# Patient Record
Sex: FEMALE | Race: WHITE | NOT HISPANIC OR LATINO | Employment: FULL TIME | URBAN - METROPOLITAN AREA
[De-identification: names, ages, dates, MRNs, and addresses within clinical notes are randomized per-mention and may not be internally consistent; named-entity substitution may affect disease eponyms.]

---

## 2019-07-10 ENCOUNTER — OFFICE VISIT (OUTPATIENT)
Dept: FAMILY MEDICINE CLINIC | Facility: CLINIC | Age: 24
End: 2019-07-10

## 2019-07-10 VITALS
OXYGEN SATURATION: 98 % | BODY MASS INDEX: 42.27 KG/M2 | TEMPERATURE: 98.1 F | HEART RATE: 84 BPM | WEIGHT: 285.4 LBS | DIASTOLIC BLOOD PRESSURE: 80 MMHG | SYSTOLIC BLOOD PRESSURE: 122 MMHG | HEIGHT: 69 IN

## 2019-07-10 DIAGNOSIS — M54.16 LUMBAR RADICULOPATHY: ICD-10-CM

## 2019-07-10 DIAGNOSIS — F41.9 ANXIETY: ICD-10-CM

## 2019-07-10 DIAGNOSIS — J30.89 ENVIRONMENTAL AND SEASONAL ALLERGIES: ICD-10-CM

## 2019-07-10 DIAGNOSIS — N94.6 DYSMENORRHEA: Primary | ICD-10-CM

## 2019-07-10 DIAGNOSIS — F34.1 DYSTHYMIA: ICD-10-CM

## 2019-07-10 PROBLEM — F32.A DEPRESSION: Status: ACTIVE | Noted: 2019-07-10

## 2019-07-10 PROCEDURE — 99214 OFFICE O/P EST MOD 30 MIN: CPT | Performed by: FAMILY MEDICINE

## 2019-07-10 RX ORDER — NORGESTIMATE AND ETHINYL ESTRADIOL 0.25-0.035
1 KIT ORAL DAILY
COMMUNITY
End: 2019-07-10

## 2019-07-10 RX ORDER — NORGESTIMATE AND ETHINYL ESTRADIOL
KIT
COMMUNITY
Start: 2019-04-26 | End: 2019-10-30 | Stop reason: SDUPTHER

## 2019-07-10 RX ORDER — CYCLOBENZAPRINE HCL 10 MG
10 TABLET ORAL 3 TIMES DAILY PRN
Qty: 90 TABLET | Refills: 1 | Status: SHIPPED | OUTPATIENT
Start: 2019-07-10 | End: 2021-08-11

## 2019-07-10 RX ORDER — BACLOFEN 10 MG/1
TABLET ORAL
Refills: 0 | COMMUNITY
Start: 2019-06-12 | End: 2019-07-10

## 2019-07-10 RX ORDER — CITALOPRAM 40 MG/1
40 TABLET ORAL DAILY
COMMUNITY
End: 2019-10-30 | Stop reason: SDUPTHER

## 2019-07-10 RX ORDER — MELOXICAM 15 MG/1
15 TABLET ORAL DAILY
Qty: 30 TABLET | Refills: 1 | Status: SHIPPED | OUTPATIENT
Start: 2019-07-10 | End: 2020-06-04 | Stop reason: SDUPTHER

## 2019-07-10 NOTE — PROGRESS NOTES
Subjective   Toy Brown is a 23 y.o. female.     Chief Complaint   Patient presents with   • Back Pain     Follow up from [facility] for back pain       History of Present Illness   Pt has had a 2 year h/o back pain with periods. Worse for the past 3 cycles. Has been to the ER for this and given flexeril and this has helped. Mostly in low back. Is all the time now. Goes down to right hip and burning and shooting. Worse with activity. Has been doing home therapy plan.     Allergies- doing ok on OTC    Depression/anxiety- stable on meds. Not needing buspar.     The following portions of the patient's history were reviewed and updated as appropriate: allergies, current medications, past family history, past medical history, past social history, past surgical history and problem list.    Past Medical History:   Diagnosis Date   • Migraine    • TMJ arthralgia        Past Surgical History:   Procedure Laterality Date   • APPENDECTOMY     • COLON SURGERY     • TONSILLECTOMY         Family History   Problem Relation Age of Onset   • Diabetes Mother    • Heart disease Mother    • Hyperlipidemia Mother    • Cancer Mother    • Hyperlipidemia Father    • Heart disease Father    • Diabetes Maternal Grandmother    • Diabetes Maternal Grandfather    • Heart disease Paternal Grandmother        Social History     Socioeconomic History   • Marital status: Single     Spouse name: Not on file   • Number of children: Not on file   • Years of education: Not on file   • Highest education level: Not on file   Tobacco Use   • Smoking status: Never Smoker   • Smokeless tobacco: Never Used       Review of Systems   Respiratory: Negative for shortness of breath.    Cardiovascular: Negative for chest pain.       Objective   Vitals:    07/10/19 1647   BP: 122/80   Pulse: 84   Temp: 98.1 °F (36.7 °C)   SpO2: 98%     Body mass index is 42.15 kg/m².  Physical Exam   Constitutional: She is oriented to person, place, and time. She appears  well-developed and well-nourished.   Cardiovascular: Normal rate, regular rhythm, normal heart sounds and intact distal pulses.   Pulmonary/Chest: Effort normal and breath sounds normal.   Musculoskeletal: Normal range of motion. She exhibits no edema.   Neurological: She is alert and oriented to person, place, and time.   Skin: Skin is warm and dry.   Psychiatric: She has a normal mood and affect. Her behavior is normal.         Assessment/Plan   Toy was seen today for back pain.    Diagnoses and all orders for this visit:    Dysmenorrhea  -     meloxicam (MOBIC) 15 MG tablet; Take 1 tablet by mouth Daily.    Environmental and seasonal allergies    Anxiety    Dysthymia    Lumbar radiculopathy  -     Ambulatory Referral to Physical Therapy Evaluate and treat  -     meloxicam (MOBIC) 15 MG tablet; Take 1 tablet by mouth Daily.  -     cyclobenzaprine (FLEXERIL) 10 MG tablet; Take 1 tablet by mouth 3 (Three) Times a Day As Needed for Muscle Spasms.             R and B discussed and f/u if worse or no better. F/u for wellness exam.

## 2019-08-05 ENCOUNTER — TREATMENT (OUTPATIENT)
Dept: PHYSICAL THERAPY | Facility: CLINIC | Age: 24
End: 2019-08-05

## 2019-08-05 DIAGNOSIS — M54.41 ACUTE BILATERAL LOW BACK PAIN WITH RIGHT-SIDED SCIATICA: Primary | ICD-10-CM

## 2019-08-05 PROCEDURE — 97161 PT EVAL LOW COMPLEX 20 MIN: CPT | Performed by: PHYSICAL THERAPIST

## 2019-08-05 NOTE — PROGRESS NOTES
Physical Therapy Initial Evaluation and Plan of Care    Patient: Toy Brown   : 1995  Diagnosis/ICD-10 Code:  Acute bilateral low back pain with right-sided sciatica [M54.41]  Referring practitioner: Penny Barraza MD    Subjective Evaluation    History of Present Illness    Subjective comment: Pt reports low back pain with radicular symptoms down R LE of ~ 3 months with no specific injury of onset  Patient Occupation: [employer] Quality of life: excellent    Pain  Current pain rating: 3  At best pain ratin  At worst pain ratin  Location: low back with radicular symptoms to R LE  Quality: throbbing, burning and tight  Relieving factors: heat, change in position and rest  Aggravating factors: prolonged positioning and sleeping  Progression: no change    Social Support  Lives in: one-story house  Lives with: significant other    Hand dominance: right    Diagnostic Tests  X-ray: normal    Treatments  Previous treatment: medication  Current treatment: medication and physical therapy  Patient Goals  Patient goals for therapy: decreased pain             Objective       Postural Observations  Seated posture: fair  Standing posture: fair  Correction of posture: has no consistent effect        Palpation   Left   Tenderness of the erector spinae.     Right Tenderness of the erector spinae.     Additional Palpation Details  TTP R piriformis    Tenderness     Left Hip   Tenderness in the PSIS.     Right Hip   Tenderness in the PSIS.     Active Range of Motion     Lumbar   Flexion: WFL  Extension: WFL  Left lateral flexion: WFL  Right lateral flexion: WFL  Left rotation: WFL  Right rotation: WFL    Additional Active Range of Motion Details  Decreased flexibilty B Hamstring R>L     Strength/Myotome Testing     Left Hip   Planes of Motion   Flexion: 5  Abduction: 5  Adduction: 5  External rotation: 5  Internal rotation: 5    Right Hip   Planes of Motion   Flexion: 5  Abduction: 5  Adduction: 5  External rotation:  5  Internal rotation: 5    Left Knee   Flexion: 5  Extension: 5    Right Knee   Flexion: 5  Extension: 5    Left Ankle/Foot   Dorsiflexion: 5    Right Ankle/Foot   Dorsiflexion: 5    Tests     Lumbar     Left   Negative passive SLR.     Right   Negative passive SLR.     Functional Assessment     Comments  Back index score : 21-40 moderate          Assessment & Plan     Assessment  Impairments: abnormal or restricted ROM, activity intolerance, lacks appropriate home exercise program and pain with function  Assessment details: Pt presents to PT with symptoms consistent with low back.  Pt would benefit from skilled PT intervention to address the deficits noted.     Prognosis: good  Functional Limitations: carrying objects, lifting, sleeping, walking, uncomfortable because of pain, moving in bed, sitting, standing and unable to perform repetitive tasks  Goals  Plan Goals: SHORT TERM GOALS: Time for Goal Achievement: 4 weeks    1.  Patient to be compliant and progression of HEP                             2.  Pain level < 4/10 at worst with daily activities to improve function    LONG TERM GOALS: Time for Goal Achievement: 8 weeks  1.  Pt. to score 0-20 on Back Index  2.  Pain level < 2/10 with all activities to return to normal.          Plan  Therapy options: will be seen for skilled physical therapy services  Planned modality interventions: cryotherapy, electrical stimulation/Russian stimulation, TENS and thermotherapy (hydrocollator packs)  Other planned modality interventions: Dry Needling  Planned therapy interventions: body mechanics training, flexibility, functional ROM exercises, home exercise program, manual therapy, neuromuscular re-education, postural training, spinal/joint mobilization, stretching, strengthening, soft tissue mobilization and abdominal trunk stabilization  Duration in visits: 16  Treatment plan discussed with: patient        Manual Therapy:          mins  84521;  Therapeutic Exercise:           mins  99162;     Neuromuscular Lloyd:         mins  21248;    Therapeutic Activity:           mins  13232;     Gait Training:            mins  59980;     Ultrasound:           mins  60946;    Electrical Stimulation:          mins  41721 ( );  Dry Needling           mins self-pay  Traction           mins 00262  Canalith Repositioning         mins 81345      Timed Treatment:      mins   Total Treatment:     45   mins    PT SIGNATURE: Jody Easley, KARINE   [place] Lic #[id number]    DATE TREATMENT INITIATED: 8/5/2019    Initial Certification  Certification Period: 11/3/2019  I certify that the therapy services are furnished while this patient is under my care.  The services outlined above are required by this patient, and will be reviewed every 90 days.     PHYSICIAN: Penny Barraza MD      DATE:     Please sign and return via fax to [phone].. Thank you, [facility].

## 2019-08-05 NOTE — PATIENT INSTRUCTIONS
HEP:    Access Code: [email or web address]   URL: [email or web address]   Date: 08/05/2019   Prepared by: Jody Easley     Exercises   Seated Hamstring Stretch - 10 reps - 3 sets - 1x daily - 7x weekly   Seated Piriformis Stretch - 10 reps - 3 sets - 1x daily - 7x weekly   Supine Piriformis Stretch with Foot on Ground - 10 reps - 3 sets - 1x daily - 7x weekly   Bent Knee Fallouts - 10 reps - 3 sets - 1x daily - 7x weekly   Supine Lower Trunk Rotation - 10 reps - 3 sets - 1x daily - 7x weekly   Supine Bridge - 10 reps - 3 sets - 1x daily - 7x weekly

## 2019-08-12 ENCOUNTER — TREATMENT (OUTPATIENT)
Dept: PHYSICAL THERAPY | Facility: CLINIC | Age: 24
End: 2019-08-12

## 2019-08-12 DIAGNOSIS — M54.41 ACUTE BILATERAL LOW BACK PAIN WITH RIGHT-SIDED SCIATICA: Primary | ICD-10-CM

## 2019-08-12 PROCEDURE — 97110 THERAPEUTIC EXERCISES: CPT | Performed by: PHYSICAL THERAPIST

## 2019-08-12 NOTE — PATIENT INSTRUCTIONS
Access Code: [email or web address]   URL: [email or web address]   Date: 08/12/2019   Prepared by: Jody Easley     Exercises   Supine Lower Trunk Rotation - 10 reps - 3 sets - 1x daily - 7x weekly   Sidelying Open Book Thoracic Lumbar Rotation and Extension - 10 reps - 3 sets - 1x daily - 7x weekly   Clamshell - 10 reps - 3 sets - 1x daily - 7x weekly

## 2019-08-19 ENCOUNTER — OFFICE VISIT (OUTPATIENT)
Dept: FAMILY MEDICINE CLINIC | Facility: CLINIC | Age: 24
End: 2019-08-19

## 2019-08-19 VITALS
HEIGHT: 69 IN | WEIGHT: 291 LBS | SYSTOLIC BLOOD PRESSURE: 120 MMHG | OXYGEN SATURATION: 98 % | BODY MASS INDEX: 43.1 KG/M2 | HEART RATE: 76 BPM | TEMPERATURE: 98 F | DIASTOLIC BLOOD PRESSURE: 70 MMHG

## 2019-08-19 DIAGNOSIS — Z00.00 HEALTHCARE MAINTENANCE: Primary | ICD-10-CM

## 2019-08-19 PROCEDURE — 99395 PREV VISIT EST AGE 18-39: CPT | Performed by: FAMILY MEDICINE

## 2019-08-19 NOTE — PROGRESS NOTES
"Toy Brown is here today for an annual physical exam.     Eating a healthy diet. Exercising routinely. lmp aug 8th  Regular periods. Wears seat belt. Feels safe at home.   Sexual activity: yes  Birth control: ocp and doing well  Pregnancy: no  Sexual and gender orientation: straight female    PHQ-2 Depression Screening  Little interest or pleasure in doing things?  0   Feeling down, depressed, or hopeless?  0   PHQ-2 Total Score  0         I have reviewed the patient's medical, family, and social history in detail and updated the computerized patient record.    Screening history:  Colonoscopy -   Mammogram-  Pap/pelvic - almost 3 years ago  Metabolic - due    Health Maintenance   Topic Date Due   • ANNUAL PHYSICAL  08/19/1997   • HPV VACCINES (1 - Female 3-dose series) 08/19/2009   • INFLUENZA VACCINE  08/01/2019   • PAP SMEAR  01/01/2020   • TDAP/TD VACCINES (2 - Td) 08/19/2026       Review of Systems   Constitutional: Negative for activity change and appetite change.   HENT: Negative for ear pain.    Eyes: Negative for pain.   Respiratory: Negative for shortness of breath.    Cardiovascular: Negative for chest pain.   Gastrointestinal: Negative for constipation and diarrhea.   Endocrine: Negative for cold intolerance and heat intolerance.   Genitourinary: Negative for dysuria.   Musculoskeletal: Negative for myalgias.   Skin: Negative for rash.   Allergic/Immunologic: Positive for environmental allergies.   Neurological: Negative for headaches.   Hematological: Does not bruise/bleed easily.   Psychiatric/Behavioral: Negative for sleep disturbance.       /70   Pulse 76   Temp 98 °F (36.7 °C) (Temporal)   Ht 175.3 cm (69\")   Wt 132 kg (291 lb)   SpO2 98%   BMI 42.97 kg/m²      Physical Exam    Vital signs reviewed.  General appearance: No acute distress  Eyes: conjunctiva clear without erythema; pupils equally round and reactive  ENT: external ears and nose normal; hearing normal, oropharynx " clear  Neck: supple; no thyromegaly  CV: normal rate and rhythm; no peripheral edema  Respiratory: normal respiratory effort; lungs clear to auscultation bilaterally  MSK: normal gait and station; no focal joint deformity or swelling  Skin: no rash or wounds; normal turgor  Neuro: cranial nerves 2-12 grossly intact; normal sensation to light touch  Psych: mood and affect normal; recent and remote memory intact  - normal  Breast- normal    No visits with results within 2 Week(s) from this visit.   Latest known visit with results is:   No results found for any previous visit.         Current Outpatient Medications:   •  citalopram (CeleXA) 40 MG tablet, Take 40 mg by mouth Daily., Disp: , Rfl:   •  cyclobenzaprine (FLEXERIL) 10 MG tablet, Take 1 tablet by mouth 3 (Three) Times a Day As Needed for Muscle Spasms., Disp: 90 tablet, Rfl: 1  •  meloxicam (MOBIC) 15 MG tablet, Take 1 tablet by mouth Daily., Disp: 30 tablet, Rfl: 1  •  TRI-LO-TATA 0.18/0.215/0.25 MG-25 MCG per tablet, , Disp: , Rfl:     Toy was seen today for annual exam.    Diagnoses and all orders for this visit:    Healthcare maintenance  -     Comprehensive Metabolic Panel  -     Lipid Panel  -     Pap IG, Rfx HPV All Pth        Encourage healthy diet and exercise.  Encourage patient to stay up to date on screening examinations as indicated based on age and risk factors. F/U yearly.

## 2019-08-20 LAB
ALBUMIN SERPL-MCNC: 4.3 G/DL (ref 3.5–5.2)
ALBUMIN/GLOB SERPL: 1.5 G/DL
ALP SERPL-CCNC: 67 U/L (ref 39–117)
ALT SERPL-CCNC: 39 U/L (ref 1–33)
AST SERPL-CCNC: 20 U/L (ref 1–32)
BILIRUB SERPL-MCNC: 0.2 MG/DL (ref 0.2–1.2)
BUN SERPL-MCNC: 17 MG/DL (ref 6–20)
BUN/CREAT SERPL: 25.4 (ref 7–25)
CALCIUM SERPL-MCNC: 9.5 MG/DL (ref 8.6–10.5)
CHLORIDE SERPL-SCNC: 101 MMOL/L (ref 98–107)
CHOLEST SERPL-MCNC: 224 MG/DL (ref 0–200)
CO2 SERPL-SCNC: 25 MMOL/L (ref 22–29)
CREAT SERPL-MCNC: 0.67 MG/DL (ref 0.57–1)
GLOBULIN SER CALC-MCNC: 2.9 GM/DL
GLUCOSE SERPL-MCNC: 95 MG/DL (ref 65–99)
HDLC SERPL-MCNC: 52 MG/DL (ref 40–60)
LDLC SERPL CALC-MCNC: 130 MG/DL (ref 0–100)
POTASSIUM SERPL-SCNC: 4.8 MMOL/L (ref 3.5–5.2)
PROT SERPL-MCNC: 7.2 G/DL (ref 6–8.5)
SODIUM SERPL-SCNC: 138 MMOL/L (ref 136–145)
TRIGL SERPL-MCNC: 211 MG/DL (ref 0–150)
VLDLC SERPL CALC-MCNC: 42.2 MG/DL

## 2019-08-22 LAB
CONV .: NORMAL
CYTOLOGIST CVX/VAG CYTO: NORMAL
CYTOLOGY CVX/VAG DOC CYTO: NORMAL
CYTOLOGY CVX/VAG DOC THIN PREP: NORMAL
DX ICD CODE: NORMAL
HIV 1 & 2 AB SER-IMP: NORMAL
Lab: NORMAL
OTHER STN SPEC: NORMAL
STAT OF ADQ CVX/VAG CYTO-IMP: NORMAL

## 2019-10-30 ENCOUNTER — TELEPHONE (OUTPATIENT)
Dept: FAMILY MEDICINE CLINIC | Facility: CLINIC | Age: 24
End: 2019-10-30

## 2019-10-30 RX ORDER — NORGESTIMATE AND ETHINYL ESTRADIOL
1 KIT DAILY
Qty: 28 TABLET | Refills: 11 | Status: SHIPPED | OUTPATIENT
Start: 2019-10-30 | End: 2021-04-09

## 2019-10-30 RX ORDER — CITALOPRAM 40 MG/1
40 TABLET ORAL DAILY
Qty: 30 TABLET | Refills: 0 | Status: SHIPPED | OUTPATIENT
Start: 2019-10-30 | End: 2020-03-11

## 2019-10-30 NOTE — TELEPHONE ENCOUNTER
Patient is calling back for her BC medicine. Per Dr Barraza- if she is out she can have a 1 month refill

## 2019-11-15 ENCOUNTER — OFFICE VISIT (OUTPATIENT)
Dept: FAMILY MEDICINE CLINIC | Facility: CLINIC | Age: 24
End: 2019-11-15

## 2019-11-15 VITALS
OXYGEN SATURATION: 99 % | HEIGHT: 69 IN | TEMPERATURE: 98.2 F | DIASTOLIC BLOOD PRESSURE: 80 MMHG | BODY MASS INDEX: 41.77 KG/M2 | WEIGHT: 282 LBS | HEART RATE: 98 BPM | SYSTOLIC BLOOD PRESSURE: 148 MMHG

## 2019-11-15 DIAGNOSIS — F33.41 RECURRENT MAJOR DEPRESSIVE DISORDER, IN PARTIAL REMISSION (HCC): Primary | ICD-10-CM

## 2019-11-15 PROCEDURE — 99214 OFFICE O/P EST MOD 30 MIN: CPT | Performed by: FAMILY MEDICINE

## 2019-11-15 RX ORDER — BUPROPION HYDROCHLORIDE 150 MG/1
150 TABLET ORAL DAILY
Qty: 90 TABLET | Refills: 3 | Status: SHIPPED | OUTPATIENT
Start: 2019-11-15 | End: 2020-11-03 | Stop reason: SDUPTHER

## 2019-11-15 NOTE — PROGRESS NOTES
Subjective   Toy Brown is a 24 y.o. female.     Chief Complaint   Patient presents with   • Depression       History of Present Illness   Having moderate depressed moods worse for a week or so since she broke up with her boyfriend. Has had for years and is worsening. Meds help some. Feels she has grief. Has social support. Trouble sleeping. The fact that she has to find a new apartment now is contributing to this.     The following portions of the patient's history were reviewed and updated as appropriate: allergies, current medications, past family history, past medical history, past social history, past surgical history and problem list.    Past Medical History:   Diagnosis Date   • Allergic    • Allergic rhinitis    • Anxiety    • Cough    • Depression    • Elevated BP without diagnosis of hypertension    • Headache, tension type, chronic    • Migraine    • Panic disorder    • Right otitis media    • Right wrist pain    • TMJ arthralgia        Past Surgical History:   Procedure Laterality Date   • APPENDECTOMY     • COLECTOMY PARTIAL / TOTAL     • TONSILLECTOMY         Family History   Problem Relation Age of Onset   • Diabetes Mother    • Heart disease Mother    • Hyperlipidemia Mother    • Cancer Mother    • Hyperlipidemia Father    • Heart disease Father    • Diabetes Maternal Grandmother    • Diabetes Maternal Grandfather    • Heart disease Paternal Grandmother        Social History     Socioeconomic History   • Marital status: Single     Spouse name: Not on file   • Number of children: Not on file   • Years of education: Not on file   • Highest education level: Not on file   Tobacco Use   • Smoking status: Never Smoker   • Smokeless tobacco: Never Used   Substance and Sexual Activity   • Alcohol use: No     Frequency: Never   • Drug use: No       Review of Systems   Respiratory: Negative for shortness of breath.    Psychiatric/Behavioral: Negative for suicidal ideas.       Objective   Visit Vitals  BP  "148/80 (BP Location: Right arm, Patient Position: Sitting)   Pulse 98   Temp 98.2 °F (36.8 °C)   Ht 175.3 cm (69.02\")   Wt 128 kg (282 lb)   SpO2 99%   BMI 41.62 kg/m²     Body mass index is 41.62 kg/m².  Physical Exam   Constitutional: She is oriented to person, place, and time. She appears well-developed and well-nourished.   Cardiovascular: Normal rate, regular rhythm, normal heart sounds and intact distal pulses.   Pulmonary/Chest: Effort normal and breath sounds normal.   Musculoskeletal: Normal range of motion. She exhibits no edema.   Neurological: She is alert and oriented to person, place, and time.   Skin: Skin is warm and dry.   Psychiatric: She has a normal mood and affect. Her behavior is normal.         Assessment/Plan   Toy was seen today for depression.    Diagnoses and all orders for this visit:    Recurrent major depressive disorder, in partial remission (CMS/HCC)  -     buPROPion XL (WELLBUTRIN XL) 150 MG 24 hr tablet; Take 1 tablet by mouth Daily.        R and B discussed and f/u in 1 month. Wellbutrin started.        "

## 2019-12-06 ENCOUNTER — OFFICE VISIT (OUTPATIENT)
Dept: FAMILY MEDICINE CLINIC | Facility: CLINIC | Age: 24
End: 2019-12-06

## 2019-12-06 VITALS
HEIGHT: 69 IN | OXYGEN SATURATION: 98 % | DIASTOLIC BLOOD PRESSURE: 90 MMHG | BODY MASS INDEX: 40.14 KG/M2 | WEIGHT: 271 LBS | HEART RATE: 104 BPM | SYSTOLIC BLOOD PRESSURE: 138 MMHG | TEMPERATURE: 98.1 F

## 2019-12-06 DIAGNOSIS — F33.41 RECURRENT MAJOR DEPRESSIVE DISORDER, IN PARTIAL REMISSION (HCC): Primary | ICD-10-CM

## 2019-12-06 DIAGNOSIS — B35.4 RINGWORM OF BODY: ICD-10-CM

## 2019-12-06 DIAGNOSIS — F51.05 INSOMNIA DUE TO OTHER MENTAL DISORDER: ICD-10-CM

## 2019-12-06 DIAGNOSIS — F99 INSOMNIA DUE TO OTHER MENTAL DISORDER: ICD-10-CM

## 2019-12-06 PROCEDURE — 99214 OFFICE O/P EST MOD 30 MIN: CPT | Performed by: FAMILY MEDICINE

## 2019-12-06 RX ORDER — CLOTRIMAZOLE 1 %
CREAM (GRAM) TOPICAL 2 TIMES DAILY
Qty: 60 G | Refills: 5 | Status: SHIPPED | OUTPATIENT
Start: 2019-12-06 | End: 2020-06-19

## 2019-12-06 RX ORDER — TRAZODONE HYDROCHLORIDE 50 MG/1
50 TABLET ORAL NIGHTLY
Qty: 90 TABLET | Refills: 1 | Status: SHIPPED | OUTPATIENT
Start: 2019-12-06 | End: 2020-04-29

## 2019-12-06 NOTE — PROGRESS NOTES
Subjective   Toy Brown is a 24 y.o. female.     Chief Complaint   Patient presents with   • Depression     Follow up    • Rash     Spot on stomach and leg        History of Present Illness   Moods much better, has her own apartment now, meds help. Still having trouble falling asleep. Wanting to try medication. Melatonin did not help. For weeks to months.     Rash on abd for 5 days, itchy    The following portions of the patient's history were reviewed and updated as appropriate: allergies, current medications, past family history, past medical history, past social history, past surgical history and problem list.    Past Medical History:   Diagnosis Date   • Allergic    • Allergic rhinitis    • Anxiety    • Cough    • Depression    • Elevated BP without diagnosis of hypertension    • Headache, tension type, chronic    • Migraine    • Panic disorder    • Right otitis media    • Right wrist pain    • TMJ arthralgia        Past Surgical History:   Procedure Laterality Date   • APPENDECTOMY     • COLECTOMY PARTIAL / TOTAL     • TONSILLECTOMY         Family History   Problem Relation Age of Onset   • Diabetes Mother    • Heart disease Mother    • Hyperlipidemia Mother    • Cancer Mother    • Hyperlipidemia Father    • Heart disease Father    • Diabetes Maternal Grandmother    • Diabetes Maternal Grandfather    • Heart disease Paternal Grandmother        Social History     Socioeconomic History   • Marital status: Single     Spouse name: Not on file   • Number of children: Not on file   • Years of education: Not on file   • Highest education level: Not on file   Tobacco Use   • Smoking status: Never Smoker   • Smokeless tobacco: Never Used   Substance and Sexual Activity   • Alcohol use: No     Frequency: Never   • Drug use: No       Review of Systems   Constitutional: Negative for fever.   Psychiatric/Behavioral: Negative for suicidal ideas.       Objective   Visit Vitals  /90   Pulse 104   Temp 98.1 °F (36.7  "°C) (Temporal)   Ht 175.3 cm (69\")   Wt 123 kg (271 lb)   SpO2 98%   BMI 40.02 kg/m²     Body mass index is 40.02 kg/m².  Physical Exam   Constitutional: She is oriented to person, place, and time. She appears well-developed and well-nourished.   Cardiovascular: Normal rate, regular rhythm, normal heart sounds and intact distal pulses.   Pulmonary/Chest: Effort normal and breath sounds normal.   Musculoskeletal: Normal range of motion. She exhibits no edema.   Neurological: She is alert and oriented to person, place, and time.   Skin: Skin is warm and dry.   Circular rash on abd in 3 spots with some central clearing.    Psychiatric: She has a normal mood and affect. Her behavior is normal.         Assessment/Plan   Toy was seen today for depression and rash.    Diagnoses and all orders for this visit:    Recurrent major depressive disorder, in partial remission (CMS/HCC)    Insomnia due to other mental disorder  -     traZODone (DESYREL) 50 MG tablet; Take 1 tablet by mouth Every Night.    Ringworm of body  -     clotrimazole (LOTRIMIN) 1 % cream; Apply  topically to the appropriate area as directed 2 (Two) Times a Day. For 2-4 weeks          Risks and benefits of meds discussed including risk of trazodone and wellbutrin but pt will stop if having symptoms and will keep the doses of both low. F/U if worse or no better and in 1 month and will consider EKG.      "

## 2020-02-18 ENCOUNTER — OFFICE VISIT (OUTPATIENT)
Dept: FAMILY MEDICINE CLINIC | Facility: CLINIC | Age: 25
End: 2020-02-18

## 2020-02-18 VITALS
BODY MASS INDEX: 40.49 KG/M2 | HEART RATE: 92 BPM | HEIGHT: 69 IN | WEIGHT: 273.4 LBS | TEMPERATURE: 97.5 F | SYSTOLIC BLOOD PRESSURE: 126 MMHG | OXYGEN SATURATION: 99 % | DIASTOLIC BLOOD PRESSURE: 82 MMHG

## 2020-02-18 DIAGNOSIS — F99 INSOMNIA DUE TO OTHER MENTAL DISORDER: ICD-10-CM

## 2020-02-18 DIAGNOSIS — F33.42 RECURRENT MAJOR DEPRESSIVE DISORDER, IN FULL REMISSION (HCC): Primary | ICD-10-CM

## 2020-02-18 DIAGNOSIS — Z79.899 HIGH RISK MEDICATION USE: ICD-10-CM

## 2020-02-18 DIAGNOSIS — F51.05 INSOMNIA DUE TO OTHER MENTAL DISORDER: ICD-10-CM

## 2020-02-18 DIAGNOSIS — F41.9 ANXIETY: ICD-10-CM

## 2020-02-18 DIAGNOSIS — L71.0 PERIORAL DERMATITIS: ICD-10-CM

## 2020-02-18 DIAGNOSIS — F90.0 ATTENTION DEFICIT HYPERACTIVITY DISORDER (ADHD), PREDOMINANTLY INATTENTIVE TYPE: ICD-10-CM

## 2020-02-18 PROCEDURE — 93000 ELECTROCARDIOGRAM COMPLETE: CPT | Performed by: FAMILY MEDICINE

## 2020-02-18 PROCEDURE — 99214 OFFICE O/P EST MOD 30 MIN: CPT | Performed by: FAMILY MEDICINE

## 2020-02-18 RX ORDER — DEXTROAMPHETAMINE SACCHARATE, AMPHETAMINE ASPARTATE MONOHYDRATE, DEXTROAMPHETAMINE SULFATE AND AMPHETAMINE SULFATE 5; 5; 5; 5 MG/1; MG/1; MG/1; MG/1
20 CAPSULE, EXTENDED RELEASE ORAL EVERY MORNING
Qty: 30 CAPSULE | Refills: 0 | Status: SHIPPED | OUTPATIENT
Start: 2020-02-18 | End: 2020-03-20 | Stop reason: SDUPTHER

## 2020-02-18 RX ORDER — PREDNISONE 10 MG/1
TABLET ORAL
Qty: 42 TABLET | Refills: 0 | Status: SHIPPED | OUTPATIENT
Start: 2020-02-18 | End: 2020-03-20

## 2020-02-18 NOTE — PROGRESS NOTES
Subjective   Toy Brown is a 24 y.o. female.     Chief Complaint   Patient presents with   • Depression     Follow up    • Hard time focusing       History of Present Illness   Pt's moods doing much better.  Medication helps.    Insomnia well controlled on medication.    Patient is having a hard time with focus and concentration even with depression and anxiety being well controlled. Having trouble at work and at home. Loosing things, trouble completing tasks. Has had for years since childhood.     Having sami-oral dermatitis and derm has given her steroids before and that did well.     The following portions of the patient's history were reviewed and updated as appropriate: allergies, current medications, past family history, past medical history, past social history, past surgical history and problem list.    Past Medical History:   Diagnosis Date   • Allergic    • Allergic rhinitis    • Anxiety    • Cough    • Depression    • Elevated BP without diagnosis of hypertension    • Headache, tension type, chronic    • Migraine    • Panic disorder    • Right otitis media    • Right wrist pain    • TMJ arthralgia        Past Surgical History:   Procedure Laterality Date   • APPENDECTOMY     • COLECTOMY PARTIAL / TOTAL     • TONSILLECTOMY         Family History   Problem Relation Age of Onset   • Diabetes Mother    • Heart disease Mother    • Hyperlipidemia Mother    • Cancer Mother    • Hyperlipidemia Father    • Heart disease Father    • Diabetes Maternal Grandmother    • Diabetes Maternal Grandfather    • Heart disease Paternal Grandmother        Social History     Socioeconomic History   • Marital status: Single     Spouse name: Not on file   • Number of children: Not on file   • Years of education: Not on file   • Highest education level: Not on file   Tobacco Use   • Smoking status: Never Smoker   • Smokeless tobacco: Never Used   Substance and Sexual Activity   • Alcohol use: No     Frequency: Never   • Drug  "use: No       Review of Systems   Respiratory: Negative for shortness of breath.    Cardiovascular: Negative for chest pain.       Objective   Visit Vitals  /82   Pulse 92   Temp 97.5 °F (36.4 °C) (Temporal)   Ht 175.3 cm (69\")   Wt 124 kg (273 lb 6.4 oz)   SpO2 99%   BMI 40.37 kg/m²     Body mass index is 40.37 kg/m².  Physical Exam   Constitutional: She is oriented to person, place, and time. She appears well-developed and well-nourished.   Cardiovascular: Normal rate, regular rhythm, normal heart sounds and intact distal pulses.   Pulmonary/Chest: Effort normal and breath sounds normal.   Musculoskeletal: Normal range of motion. She exhibits no edema.   Neurological: She is alert and oriented to person, place, and time.   Skin: Skin is warm and dry.   Psychiatric: She has a normal mood and affect. Her behavior is normal.         ECG 12 Lead  Date/Time: 2/18/2020 10:30 AM  Performed by: Penny Barraza MD  Authorized by: Penny Barraza MD   Comparison: not compared with previous ECG   Previous ECG: no previous ECG available  Rhythm: sinus rhythm  Rate: normal  QRS axis: normal    Clinical impression: non-specific ECG  Comments: High risk med use              Assessment/Plan   Toy was seen today for depression and hard time focusing.    Diagnoses and all orders for this visit:    Recurrent major depressive disorder, in full remission (CMS/HCC)    Anxiety    High risk medication use  -     ECG 12 Lead  -     Compliance Drug Analysis, Ur - Urine, Clean Catch    Insomnia due to other mental disorder    Attention deficit hyperactivity disorder (ADHD), predominantly inattentive type  -     amphetamine-dextroamphetamine XR (ADDERALL XR) 20 MG 24 hr capsule; Take 1 capsule by mouth Every Morning    Perioral dermatitis  -     predniSONE (DELTASONE) 10 MG tablet; Use 6 pills for one day then decrease by one pill every 2 days till finished.        Risks and benefits discussed and f/u in 1 month. Sincere, " contract. Started adderall. F/U if worse or no better.

## 2020-02-19 DIAGNOSIS — F90.0 ATTENTION DEFICIT HYPERACTIVITY DISORDER (ADHD), PREDOMINANTLY INATTENTIVE TYPE: ICD-10-CM

## 2020-02-19 NOTE — TELEPHONE ENCOUNTER
PA initiated for Adderall XR 20 er capsules, take one daily in am.    PA was approved for Adderall XR 20 er capsule.

## 2020-02-23 LAB — DRUGS UR: NORMAL

## 2020-03-11 RX ORDER — CITALOPRAM 40 MG/1
TABLET ORAL
Qty: 30 TABLET | Refills: 0 | Status: SHIPPED | OUTPATIENT
Start: 2020-03-11 | End: 2020-04-13

## 2020-03-20 ENCOUNTER — OFFICE VISIT (OUTPATIENT)
Dept: FAMILY MEDICINE CLINIC | Facility: CLINIC | Age: 25
End: 2020-03-20

## 2020-03-20 VITALS
BODY MASS INDEX: 39.69 KG/M2 | DIASTOLIC BLOOD PRESSURE: 70 MMHG | OXYGEN SATURATION: 98 % | HEIGHT: 69 IN | WEIGHT: 268 LBS | TEMPERATURE: 97.9 F | SYSTOLIC BLOOD PRESSURE: 104 MMHG | HEART RATE: 94 BPM

## 2020-03-20 DIAGNOSIS — F90.0 ATTENTION DEFICIT HYPERACTIVITY DISORDER (ADHD), PREDOMINANTLY INATTENTIVE TYPE: Primary | ICD-10-CM

## 2020-03-20 PROCEDURE — 99213 OFFICE O/P EST LOW 20 MIN: CPT | Performed by: FAMILY MEDICINE

## 2020-03-20 RX ORDER — DEXTROAMPHETAMINE SACCHARATE, AMPHETAMINE ASPARTATE MONOHYDRATE, DEXTROAMPHETAMINE SULFATE AND AMPHETAMINE SULFATE 7.5; 7.5; 7.5; 7.5 MG/1; MG/1; MG/1; MG/1
30 CAPSULE, EXTENDED RELEASE ORAL EVERY MORNING
Qty: 30 CAPSULE | Refills: 0 | Status: SHIPPED | OUTPATIENT
Start: 2020-03-20 | End: 2020-04-28 | Stop reason: SDUPTHER

## 2020-03-20 NOTE — PROGRESS NOTES
"Subjective   Toy Brown is a 24 y.o. female.     Chief Complaint   Patient presents with   • ADD     Follow up        History of Present Illness   Patient is here for a checkup of her ADHD.  Was recently diagnosed.  Recently started on medication.  Is working well with no side effects but could work better.    The following portions of the patient's history were reviewed and updated as appropriate: allergies, current medications, past family history, past medical history, past social history, past surgical history and problem list.    Past Medical History:   Diagnosis Date   • Allergic    • Allergic rhinitis    • Anxiety    • Cough    • Depression    • Elevated BP without diagnosis of hypertension    • Headache, tension type, chronic    • Migraine    • Panic disorder    • Right otitis media    • Right wrist pain    • TMJ arthralgia        Past Surgical History:   Procedure Laterality Date   • APPENDECTOMY     • COLECTOMY PARTIAL / TOTAL     • TONSILLECTOMY         Family History   Problem Relation Age of Onset   • Diabetes Mother    • Heart disease Mother    • Hyperlipidemia Mother    • Cancer Mother    • Hyperlipidemia Father    • Heart disease Father    • Diabetes Maternal Grandmother    • Diabetes Maternal Grandfather    • Heart disease Paternal Grandmother        Social History     Socioeconomic History   • Marital status: Single     Spouse name: Not on file   • Number of children: Not on file   • Years of education: Not on file   • Highest education level: Not on file   Tobacco Use   • Smoking status: Never Smoker   • Smokeless tobacco: Never Used   Substance and Sexual Activity   • Alcohol use: No     Frequency: Never   • Drug use: No       Review of Systems   Constitutional: Negative for fever.   Cardiovascular: Negative for palpitations.       Objective   Visit Vitals  /70   Pulse 94   Temp 97.9 °F (36.6 °C) (Oral)   Ht 175.3 cm (69\")   Wt 122 kg (268 lb)   SpO2 98%   BMI 39.58 kg/m²     Body " mass index is 39.58 kg/m².  Physical Exam   Constitutional: She is oriented to person, place, and time. She appears well-developed and well-nourished.   Musculoskeletal: Normal range of motion. She exhibits no edema.   Neurological: She is alert and oriented to person, place, and time.   Skin: Skin is warm and dry.   Psychiatric: She has a normal mood and affect. Her behavior is normal.         Assessment/Plan   Toy was seen today for add.    Diagnoses and all orders for this visit:    Attention deficit hyperactivity disorder (ADHD), predominantly inattentive type  -     amphetamine-dextroamphetamine XR (ADDERALL XR) 30 MG 24 hr capsule; Take 1 capsule by mouth Every Morning          Doing well, continue medication and increased dose, follow-up in 3 months.

## 2020-03-26 ENCOUNTER — DOCUMENTATION (OUTPATIENT)
Dept: PHYSICAL THERAPY | Facility: CLINIC | Age: 25
End: 2020-03-26

## 2020-03-26 NOTE — PROGRESS NOTES
Discharge Summary  Discharge Summary from Physical Therapy Report      Dates  PT visit: 8/5/19  Number of Visits: 2    Discharge Status of Patient: See MD Note dated 8/12/19    Goals: Not Met    Discharge Plan: Continue with current home exercise program as instructed    Comments pt did not return after last visit    Date of Discharge         Jody Easley, PT  Physical Therapist

## 2020-04-13 RX ORDER — CITALOPRAM 40 MG/1
TABLET ORAL
Qty: 30 TABLET | Refills: 0 | Status: SHIPPED | OUTPATIENT
Start: 2020-04-13 | End: 2020-04-28 | Stop reason: SDUPTHER

## 2020-04-28 DIAGNOSIS — F90.0 ATTENTION DEFICIT HYPERACTIVITY DISORDER (ADHD), PREDOMINANTLY INATTENTIVE TYPE: ICD-10-CM

## 2020-04-28 DIAGNOSIS — F51.05 INSOMNIA DUE TO OTHER MENTAL DISORDER: ICD-10-CM

## 2020-04-28 DIAGNOSIS — F99 INSOMNIA DUE TO OTHER MENTAL DISORDER: ICD-10-CM

## 2020-04-28 RX ORDER — CITALOPRAM 40 MG/1
40 TABLET ORAL DAILY
Qty: 90 TABLET | Refills: 0 | Status: SHIPPED | OUTPATIENT
Start: 2020-04-28 | End: 2020-08-31

## 2020-04-28 NOTE — TELEPHONE ENCOUNTER
PT IS REQUESTING A REFILL ON amphetamine-dextroamphetamine XR (ADDERALL XR) 30 MG 24 hr capsule.     PT IS WONDERING IF IT COULD BE BUMPED UP TO 40MG.     [facility] CONFIRMED.

## 2020-04-29 DIAGNOSIS — F90.0 ATTENTION DEFICIT HYPERACTIVITY DISORDER (ADHD), PREDOMINANTLY INATTENTIVE TYPE: ICD-10-CM

## 2020-04-29 RX ORDER — DEXTROAMPHETAMINE SACCHARATE, AMPHETAMINE ASPARTATE MONOHYDRATE, DEXTROAMPHETAMINE SULFATE AND AMPHETAMINE SULFATE 5; 5; 5; 5 MG/1; MG/1; MG/1; MG/1
40 CAPSULE, EXTENDED RELEASE ORAL EVERY MORNING
Qty: 60 CAPSULE | Refills: 0 | Status: SHIPPED | OUTPATIENT
Start: 2020-04-29 | End: 2020-06-04 | Stop reason: SDUPTHER

## 2020-04-29 RX ORDER — DEXTROAMPHETAMINE SACCHARATE, AMPHETAMINE ASPARTATE MONOHYDRATE, DEXTROAMPHETAMINE SULFATE AND AMPHETAMINE SULFATE 7.5; 7.5; 7.5; 7.5 MG/1; MG/1; MG/1; MG/1
30 CAPSULE, EXTENDED RELEASE ORAL EVERY MORNING
Qty: 30 CAPSULE | Refills: 0 | Status: SHIPPED | OUTPATIENT
Start: 2020-04-29 | End: 2020-04-29 | Stop reason: SDUPTHER

## 2020-04-29 RX ORDER — TRAZODONE HYDROCHLORIDE 50 MG/1
TABLET ORAL
Qty: 90 TABLET | Refills: 1 | Status: SHIPPED | OUTPATIENT
Start: 2020-04-29 | End: 2020-09-15 | Stop reason: SDUPTHER

## 2020-06-04 DIAGNOSIS — M54.16 LUMBAR RADICULOPATHY: ICD-10-CM

## 2020-06-04 DIAGNOSIS — N94.6 DYSMENORRHEA: ICD-10-CM

## 2020-06-04 DIAGNOSIS — F90.0 ATTENTION DEFICIT HYPERACTIVITY DISORDER (ADHD), PREDOMINANTLY INATTENTIVE TYPE: ICD-10-CM

## 2020-06-05 ENCOUNTER — TELEPHONE (OUTPATIENT)
Dept: FAMILY MEDICINE CLINIC | Facility: CLINIC | Age: 25
End: 2020-06-05

## 2020-06-05 RX ORDER — DEXTROAMPHETAMINE SACCHARATE, AMPHETAMINE ASPARTATE MONOHYDRATE, DEXTROAMPHETAMINE SULFATE AND AMPHETAMINE SULFATE 5; 5; 5; 5 MG/1; MG/1; MG/1; MG/1
40 CAPSULE, EXTENDED RELEASE ORAL EVERY MORNING
Qty: 60 CAPSULE | Refills: 0 | Status: SHIPPED | OUTPATIENT
Start: 2020-06-05 | End: 2020-06-19

## 2020-06-05 RX ORDER — MELOXICAM 15 MG/1
15 TABLET ORAL DAILY
Qty: 30 TABLET | Refills: 1 | Status: SHIPPED | OUTPATIENT
Start: 2020-06-05 | End: 2020-09-04

## 2020-06-05 NOTE — TELEPHONE ENCOUNTER
I can not call in her Adderall. She does not need any cancel.    She takes 40 mg in the am, which you called in already and she takes an additional 30 mg in pm which needs to be e[facility]scribed to [facility].

## 2020-06-05 NOTE — TELEPHONE ENCOUNTER
Patient did get her script for Adderall  40 mg, but, she also takes Adderall XR 30 mg at night. Please send to [facility] in chart.

## 2020-06-05 NOTE — TELEPHONE ENCOUNTER
PT BELIEVES THAT SHE WAS PRESCRIBED 40MG OF ADDERALL IN THE MORNING AND 30MG AT NIGHT, SO SHE BELIEVES THAT THE RX THAT WAS JUST FILLED IF FOR THE INCORRECT DOSAGE.

## 2020-06-19 ENCOUNTER — OFFICE VISIT (OUTPATIENT)
Dept: FAMILY MEDICINE CLINIC | Facility: CLINIC | Age: 25
End: 2020-06-19

## 2020-06-19 VITALS
HEIGHT: 69 IN | TEMPERATURE: 98.7 F | WEIGHT: 268 LBS | OXYGEN SATURATION: 95 % | BODY MASS INDEX: 39.69 KG/M2 | HEART RATE: 98 BPM | DIASTOLIC BLOOD PRESSURE: 72 MMHG | SYSTOLIC BLOOD PRESSURE: 122 MMHG

## 2020-06-19 DIAGNOSIS — F90.0 ATTENTION DEFICIT HYPERACTIVITY DISORDER (ADHD), PREDOMINANTLY INATTENTIVE TYPE: Primary | ICD-10-CM

## 2020-06-19 PROCEDURE — 99213 OFFICE O/P EST LOW 20 MIN: CPT | Performed by: FAMILY MEDICINE

## 2020-06-19 RX ORDER — DEXTROAMPHETAMINE SULFATE, DEXTROAMPHETAMINE SACCHARATE, AMPHETAMINE SULFATE AND AMPHETAMINE ASPARTATE 7.5; 7.5; 7.5; 7.5 MG/1; MG/1; MG/1; MG/1
20 CAPSULE, EXTENDED RELEASE ORAL EVERY MORNING
COMMUNITY
Start: 2020-04-29 | End: 2020-06-19

## 2020-06-19 RX ORDER — DEXTROAMPHETAMINE SACCHARATE, AMPHETAMINE ASPARTATE MONOHYDRATE, DEXTROAMPHETAMINE SULFATE AND AMPHETAMINE SULFATE 7.5; 7.5; 7.5; 7.5 MG/1; MG/1; MG/1; MG/1
60 CAPSULE, EXTENDED RELEASE ORAL EVERY MORNING
Qty: 60 CAPSULE | Refills: 0 | Status: SHIPPED | OUTPATIENT
Start: 2020-06-19 | End: 2020-07-30 | Stop reason: SDUPTHER

## 2020-06-19 NOTE — PROGRESS NOTES
Subjective   Toy Brown is a 24 y.o. female.     Chief Complaint   Patient presents with   • ADHD       History of Present Illness   Patient is here for a checkup of her ADHD.  Was recently diagnosed.  Recently started on medication.  Is working well with no side effects but could work better. Has been taking 40mg a day.    The following portions of the patient's history were reviewed and updated as appropriate: allergies, current medications, past family history, past medical history, past social history, past surgical history and problem list.    Past Medical History:   Diagnosis Date   • Allergic    • Allergic rhinitis    • Anxiety    • Cough    • Depression    • Elevated BP without diagnosis of hypertension    • Headache, tension type, chronic    • Migraine    • Panic disorder    • Right otitis media    • Right wrist pain    • TMJ arthralgia        Past Surgical History:   Procedure Laterality Date   • APPENDECTOMY     • COLECTOMY PARTIAL / TOTAL     • TONSILLECTOMY         Family History   Problem Relation Age of Onset   • Diabetes Mother    • Heart disease Mother    • Hyperlipidemia Mother    • Cancer Mother    • Hyperlipidemia Father    • Heart disease Father    • Diabetes Maternal Grandmother    • Diabetes Maternal Grandfather    • Heart disease Paternal Grandmother        Social History     Socioeconomic History   • Marital status: Single     Spouse name: Not on file   • Number of children: Not on file   • Years of education: Not on file   • Highest education level: Not on file   Tobacco Use   • Smoking status: Never Smoker   • Smokeless tobacco: Never Used   Substance and Sexual Activity   • Alcohol use: No     Frequency: Never   • Drug use: No       Review of Systems   Constitutional: Negative for fever.   Cardiovascular: Negative for palpitations.   Psychiatric/Behavioral: Negative for sleep disturbance.       Objective   Visit Vitals  /72 (BP Location: Right arm, Patient Position: Sitting)  "  Pulse 98   Temp 98.7 °F (37.1 °C)   Ht 175.3 cm (69.02\")   Wt 122 kg (268 lb)   SpO2 95%   BMI 39.56 kg/m²     Body mass index is 39.56 kg/m².  Physical Exam   Constitutional: She is oriented to person, place, and time. She appears well-developed and well-nourished.   Musculoskeletal: Normal range of motion. She exhibits no edema.   Neurological: She is alert and oriented to person, place, and time.   Skin: Skin is warm and dry.   Psychiatric: She has a normal mood and affect. Her behavior is normal.         Assessment/Plan   Toy was seen today for adhd.    Diagnoses and all orders for this visit:    Attention deficit hyperactivity disorder (ADHD), predominantly inattentive type  -     amphetamine-dextroamphetamine XR (Adderall XR) 30 MG 24 hr capsule; Take 2 capsules by mouth Every Morning          Doing well, continue medication and increased dose, follow-up in 3 months. Sincere.      "

## 2020-07-30 DIAGNOSIS — F90.0 ATTENTION DEFICIT HYPERACTIVITY DISORDER (ADHD), PREDOMINANTLY INATTENTIVE TYPE: ICD-10-CM

## 2020-07-31 RX ORDER — DEXTROAMPHETAMINE SACCHARATE, AMPHETAMINE ASPARTATE MONOHYDRATE, DEXTROAMPHETAMINE SULFATE AND AMPHETAMINE SULFATE 7.5; 7.5; 7.5; 7.5 MG/1; MG/1; MG/1; MG/1
60 CAPSULE, EXTENDED RELEASE ORAL EVERY MORNING
Qty: 60 CAPSULE | Refills: 0 | Status: SHIPPED | OUTPATIENT
Start: 2020-07-31 | End: 2020-09-15 | Stop reason: SDUPTHER

## 2020-08-03 ENCOUNTER — TELEPHONE (OUTPATIENT)
Dept: FAMILY MEDICINE CLINIC | Facility: CLINIC | Age: 25
End: 2020-08-03

## 2020-08-05 ENCOUNTER — TELEPHONE (OUTPATIENT)
Dept: FAMILY MEDICINE CLINIC | Facility: CLINIC | Age: 25
End: 2020-08-05

## 2020-08-05 NOTE — TELEPHONE ENCOUNTER
[facility] approved Adderall 30 mg tablets.    [Post-Treatment Evaluation] : post-treatment evaluation for [Head and Neck Cancer] : head and neck cancer

## 2020-08-31 RX ORDER — CITALOPRAM 40 MG/1
TABLET ORAL
Qty: 90 TABLET | Refills: 0 | Status: SHIPPED | OUTPATIENT
Start: 2020-08-31 | End: 2020-12-15 | Stop reason: SDUPTHER

## 2020-09-03 DIAGNOSIS — N94.6 DYSMENORRHEA: ICD-10-CM

## 2020-09-03 DIAGNOSIS — M54.16 LUMBAR RADICULOPATHY: ICD-10-CM

## 2020-09-04 RX ORDER — MELOXICAM 15 MG/1
TABLET ORAL
Qty: 30 TABLET | Refills: 1 | Status: SHIPPED | OUTPATIENT
Start: 2020-09-04 | End: 2020-10-27 | Stop reason: SDUPTHER

## 2020-09-13 DIAGNOSIS — F90.0 ATTENTION DEFICIT HYPERACTIVITY DISORDER (ADHD), PREDOMINANTLY INATTENTIVE TYPE: ICD-10-CM

## 2020-09-14 RX ORDER — DEXTROAMPHETAMINE SACCHARATE, AMPHETAMINE ASPARTATE MONOHYDRATE, DEXTROAMPHETAMINE SULFATE AND AMPHETAMINE SULFATE 7.5; 7.5; 7.5; 7.5 MG/1; MG/1; MG/1; MG/1
60 CAPSULE, EXTENDED RELEASE ORAL EVERY MORNING
Qty: 60 CAPSULE | Refills: 0 | OUTPATIENT
Start: 2020-09-14

## 2020-09-15 ENCOUNTER — OFFICE VISIT (OUTPATIENT)
Dept: FAMILY MEDICINE CLINIC | Facility: CLINIC | Age: 25
End: 2020-09-15

## 2020-09-15 VITALS
SYSTOLIC BLOOD PRESSURE: 130 MMHG | OXYGEN SATURATION: 98 % | BODY MASS INDEX: 41.06 KG/M2 | DIASTOLIC BLOOD PRESSURE: 70 MMHG | WEIGHT: 286.8 LBS | TEMPERATURE: 97.7 F | HEIGHT: 70 IN | HEART RATE: 95 BPM

## 2020-09-15 DIAGNOSIS — F51.05 INSOMNIA DUE TO OTHER MENTAL DISORDER: ICD-10-CM

## 2020-09-15 DIAGNOSIS — F33.42 RECURRENT MAJOR DEPRESSIVE DISORDER, IN FULL REMISSION (HCC): ICD-10-CM

## 2020-09-15 DIAGNOSIS — F90.0 ATTENTION DEFICIT HYPERACTIVITY DISORDER (ADHD), PREDOMINANTLY INATTENTIVE TYPE: Primary | ICD-10-CM

## 2020-09-15 DIAGNOSIS — F99 INSOMNIA DUE TO OTHER MENTAL DISORDER: ICD-10-CM

## 2020-09-15 DIAGNOSIS — F41.9 ANXIETY: ICD-10-CM

## 2020-09-15 PROCEDURE — 99214 OFFICE O/P EST MOD 30 MIN: CPT | Performed by: FAMILY MEDICINE

## 2020-09-15 RX ORDER — DEXTROAMPHETAMINE SACCHARATE, AMPHETAMINE ASPARTATE MONOHYDRATE, DEXTROAMPHETAMINE SULFATE AND AMPHETAMINE SULFATE 7.5; 7.5; 7.5; 7.5 MG/1; MG/1; MG/1; MG/1
60 CAPSULE, EXTENDED RELEASE ORAL EVERY MORNING
Qty: 60 CAPSULE | Refills: 0 | Status: SHIPPED | OUTPATIENT
Start: 2020-09-15 | End: 2020-10-27 | Stop reason: SDUPTHER

## 2020-09-15 RX ORDER — TRAZODONE HYDROCHLORIDE 50 MG/1
50-100 TABLET ORAL
Qty: 180 TABLET | Refills: 1 | Status: SHIPPED | OUTPATIENT
Start: 2020-09-15 | End: 2020-10-27

## 2020-09-15 NOTE — PROGRESS NOTES
Subjective   Toy Brown is a 25 y.o. female.     Chief Complaint   Patient presents with   • ADD   • Depression   • Med Refill       History of Present Illness   Patient is here for a checkup of her ADHD.  Was recently diagnosed.  Recently started on medication.  Is working well with no side effects but could work better. Has been taking 40mg a day.    Anxiety/depression- having a little more anxiety due to her new job but moods ok. Meds help. Sleep ok but could be better.     The following portions of the patient's history were reviewed and updated as appropriate: allergies, current medications, past family history, past medical history, past social history, past surgical history and problem list.    Past Medical History:   Diagnosis Date   • Allergic    • Allergic rhinitis    • Anxiety    • Cough    • Depression    • Elevated BP without diagnosis of hypertension    • Headache, tension type, chronic    • Migraine    • Panic disorder    • Right otitis media    • Right wrist pain    • TMJ arthralgia        Past Surgical History:   Procedure Laterality Date   • APPENDECTOMY     • COLECTOMY PARTIAL / TOTAL     • TONSILLECTOMY         Family History   Problem Relation Age of Onset   • Diabetes Mother    • Heart disease Mother    • Hyperlipidemia Mother    • Cancer Mother    • Hyperlipidemia Father    • Heart disease Father    • Diabetes Maternal Grandmother    • Diabetes Maternal Grandfather    • Heart disease Paternal Grandmother        Social History     Socioeconomic History   • Marital status: Single     Spouse name: Not on file   • Number of children: Not on file   • Years of education: Not on file   • Highest education level: Not on file   Tobacco Use   • Smoking status: Never Smoker   • Smokeless tobacco: Never Used   Substance and Sexual Activity   • Alcohol use: No     Frequency: Never   • Drug use: No       Review of Systems   Constitutional: Negative for fever.   Cardiovascular: Negative for  "palpitations.   Psychiatric/Behavioral: Negative for sleep disturbance.       Objective   Visit Vitals  /70   Pulse 95   Temp 97.7 °F (36.5 °C) (Temporal)   Ht 177.8 cm (70\")   Wt 130 kg (286 lb 12.8 oz)   SpO2 98%   BMI 41.15 kg/m²     Body mass index is 41.15 kg/m².  Physical Exam   Constitutional: She is oriented to person, place, and time. She appears well-developed and well-nourished.   Cardiovascular: Normal rate, regular rhythm and normal heart sounds.   Pulmonary/Chest: Effort normal and breath sounds normal.   Abdominal: Normal appearance.   Musculoskeletal: Normal range of motion. No swelling.   Neurological: She is alert and oriented to person, place, and time.   Skin: Skin is warm and dry.   Psychiatric: Her behavior is normal. Mood normal.         Assessment/Plan   Toy was seen today for add, depression and med refill.    Diagnoses and all orders for this visit:    Attention deficit hyperactivity disorder (ADHD), predominantly inattentive type  -     amphetamine-dextroamphetamine XR (Adderall XR) 30 MG 24 hr capsule; Take 2 capsules by mouth Every Morning    Recurrent major depressive disorder, in full remission (CMS/HCC)    Anxiety    Insomnia due to other mental disorder  -     traZODone (DESYREL) 50 MG tablet; Take 1-2 tablets by mouth every night at bedtime.          Doing well, continue medication and increased dose, follow-up in 3 months. Sincere. Increased trazodone and discussed risks.      "

## 2020-10-21 DIAGNOSIS — F99 INSOMNIA DUE TO OTHER MENTAL DISORDER: ICD-10-CM

## 2020-10-21 DIAGNOSIS — F51.05 INSOMNIA DUE TO OTHER MENTAL DISORDER: ICD-10-CM

## 2020-10-21 DIAGNOSIS — N94.6 DYSMENORRHEA: ICD-10-CM

## 2020-10-21 DIAGNOSIS — F90.0 ATTENTION DEFICIT HYPERACTIVITY DISORDER (ADHD), PREDOMINANTLY INATTENTIVE TYPE: ICD-10-CM

## 2020-10-21 DIAGNOSIS — M54.16 LUMBAR RADICULOPATHY: ICD-10-CM

## 2020-10-23 DIAGNOSIS — F90.0 ATTENTION DEFICIT HYPERACTIVITY DISORDER (ADHD), PREDOMINANTLY INATTENTIVE TYPE: ICD-10-CM

## 2020-10-23 RX ORDER — DEXTROAMPHETAMINE SACCHARATE, AMPHETAMINE ASPARTATE MONOHYDRATE, DEXTROAMPHETAMINE SULFATE AND AMPHETAMINE SULFATE 7.5; 7.5; 7.5; 7.5 MG/1; MG/1; MG/1; MG/1
60 CAPSULE, EXTENDED RELEASE ORAL EVERY MORNING
Qty: 60 CAPSULE | Refills: 0 | Status: CANCELLED | OUTPATIENT
Start: 2020-10-23

## 2020-10-27 DIAGNOSIS — M54.16 LUMBAR RADICULOPATHY: ICD-10-CM

## 2020-10-27 DIAGNOSIS — F90.0 ATTENTION DEFICIT HYPERACTIVITY DISORDER (ADHD), PREDOMINANTLY INATTENTIVE TYPE: ICD-10-CM

## 2020-10-27 DIAGNOSIS — N94.6 DYSMENORRHEA: ICD-10-CM

## 2020-10-27 RX ORDER — TRAZODONE HYDROCHLORIDE 50 MG/1
TABLET ORAL
Qty: 90 TABLET | Refills: 1 | Status: SHIPPED | OUTPATIENT
Start: 2020-10-27 | End: 2021-08-11

## 2020-10-27 RX ORDER — MELOXICAM 15 MG/1
15 TABLET ORAL DAILY
Qty: 30 TABLET | Refills: 1 | Status: SHIPPED | OUTPATIENT
Start: 2020-10-27 | End: 2020-11-03 | Stop reason: SDUPTHER

## 2020-10-27 RX ORDER — MELOXICAM 15 MG/1
15 TABLET ORAL DAILY
Qty: 30 TABLET | Refills: 1 | Status: CANCELLED | OUTPATIENT
Start: 2020-10-27

## 2020-10-27 RX ORDER — DEXTROAMPHETAMINE SACCHARATE, AMPHETAMINE ASPARTATE MONOHYDRATE, DEXTROAMPHETAMINE SULFATE AND AMPHETAMINE SULFATE 7.5; 7.5; 7.5; 7.5 MG/1; MG/1; MG/1; MG/1
60 CAPSULE, EXTENDED RELEASE ORAL EVERY MORNING
Qty: 60 CAPSULE | Refills: 0 | Status: CANCELLED | OUTPATIENT
Start: 2020-10-27

## 2020-10-27 RX ORDER — DEXTROAMPHETAMINE SACCHARATE, AMPHETAMINE ASPARTATE MONOHYDRATE, DEXTROAMPHETAMINE SULFATE AND AMPHETAMINE SULFATE 7.5; 7.5; 7.5; 7.5 MG/1; MG/1; MG/1; MG/1
60 CAPSULE, EXTENDED RELEASE ORAL EVERY MORNING
Qty: 60 CAPSULE | Refills: 0 | Status: SHIPPED | OUTPATIENT
Start: 2020-10-27 | End: 2020-11-03 | Stop reason: SDUPTHER

## 2020-11-03 DIAGNOSIS — F33.41 RECURRENT MAJOR DEPRESSIVE DISORDER, IN PARTIAL REMISSION (HCC): ICD-10-CM

## 2020-11-03 DIAGNOSIS — N94.6 DYSMENORRHEA: ICD-10-CM

## 2020-11-03 DIAGNOSIS — M54.16 LUMBAR RADICULOPATHY: ICD-10-CM

## 2020-11-03 DIAGNOSIS — F99 INSOMNIA DUE TO OTHER MENTAL DISORDER: ICD-10-CM

## 2020-11-03 DIAGNOSIS — F51.05 INSOMNIA DUE TO OTHER MENTAL DISORDER: ICD-10-CM

## 2020-11-03 DIAGNOSIS — F90.0 ATTENTION DEFICIT HYPERACTIVITY DISORDER (ADHD), PREDOMINANTLY INATTENTIVE TYPE: ICD-10-CM

## 2020-11-03 NOTE — TELEPHONE ENCOUNTER
Caller: Toy Brown    Relationship: Self    Best call back number: [phone]    Medication needed:   Requested Prescriptions     Pending Prescriptions Disp Refills   • amphetamine-dextroamphetamine XR (Adderall XR) 30 MG 24 hr capsule 60 capsule 0     Sig: Take 2 capsules by mouth Every Morning   • meloxicam (MOBIC) 15 MG tablet 30 tablet 1     Sig: Take 1 tablet by mouth Daily.   • buPROPion XL (Wellbutrin XL) 150 MG 24 hr tablet 90 tablet 3     Sig: Take 1 tablet by mouth Daily.       When do you need the refill by: 11/3/20    What details did the patient provide when requesting the medication: scripts were sent to the wrong pharmacy. Thought she updated on file and didn't.. Please send to correct pharmacy below. Pt needs a PA for adderall because its showing $123.88 and she normally pays around $20. Please advise    Does the patient have less than a 3 day supply:  [x] Yes  [] No    What is the patient's preferred pharmacy:    [facility]/pharmacy #[id number] - [place], [place] - [address] [place] [address] - [phone] [facility] [phone]   [phone]

## 2020-11-03 NOTE — TELEPHONE ENCOUNTER
Spoke with d/t request on the 27th saying [place] [facility].  She stated she thought that she changed it on MyChart and she needs them to go to [facility]    Also asked about PA. I will asked Marylu if she has received a PA request and has done it.

## 2020-11-04 RX ORDER — DEXTROAMPHETAMINE SACCHARATE, AMPHETAMINE ASPARTATE MONOHYDRATE, DEXTROAMPHETAMINE SULFATE AND AMPHETAMINE SULFATE 7.5; 7.5; 7.5; 7.5 MG/1; MG/1; MG/1; MG/1
60 CAPSULE, EXTENDED RELEASE ORAL EVERY MORNING
Qty: 60 CAPSULE | Refills: 0 | Status: SHIPPED | OUTPATIENT
Start: 2020-11-04 | End: 2020-12-15 | Stop reason: SDUPTHER

## 2020-11-04 RX ORDER — MELOXICAM 15 MG/1
15 TABLET ORAL DAILY
Qty: 30 TABLET | Refills: 1 | Status: SHIPPED | OUTPATIENT
Start: 2020-11-04 | End: 2021-01-04

## 2020-11-04 RX ORDER — BUPROPION HYDROCHLORIDE 150 MG/1
150 TABLET ORAL DAILY
Qty: 90 TABLET | Refills: 3 | Status: SHIPPED | OUTPATIENT
Start: 2020-11-04 | End: 2021-03-09

## 2020-12-14 RX ORDER — CITALOPRAM 40 MG/1
TABLET ORAL
Qty: 90 TABLET | Refills: 0 | OUTPATIENT
Start: 2020-12-14

## 2020-12-15 DIAGNOSIS — F90.0 ATTENTION DEFICIT HYPERACTIVITY DISORDER (ADHD), PREDOMINANTLY INATTENTIVE TYPE: ICD-10-CM

## 2020-12-15 NOTE — TELEPHONE ENCOUNTER
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      CALL BACK NUMBER [phone]    CAN A MED REFILL BE SENT TO HOLD HER OVER UNTIL HER APPOINTMENT

## 2020-12-16 RX ORDER — DEXTROAMPHETAMINE SACCHARATE, AMPHETAMINE ASPARTATE MONOHYDRATE, DEXTROAMPHETAMINE SULFATE AND AMPHETAMINE SULFATE 7.5; 7.5; 7.5; 7.5 MG/1; MG/1; MG/1; MG/1
60 CAPSULE, EXTENDED RELEASE ORAL EVERY MORNING
Qty: 60 CAPSULE | Refills: 0 | Status: SHIPPED | OUTPATIENT
Start: 2020-12-16 | End: 2021-01-04

## 2020-12-16 RX ORDER — CITALOPRAM 40 MG/1
40 TABLET ORAL DAILY
Qty: 90 TABLET | Refills: 3 | Status: SHIPPED | OUTPATIENT
Start: 2020-12-16 | End: 2021-03-09

## 2021-01-04 ENCOUNTER — OFFICE VISIT (OUTPATIENT)
Dept: FAMILY MEDICINE CLINIC | Facility: CLINIC | Age: 26
End: 2021-01-04

## 2021-01-04 VITALS
HEART RATE: 109 BPM | TEMPERATURE: 97.3 F | SYSTOLIC BLOOD PRESSURE: 136 MMHG | OXYGEN SATURATION: 98 % | HEIGHT: 69 IN | DIASTOLIC BLOOD PRESSURE: 88 MMHG | BODY MASS INDEX: 43.4 KG/M2 | WEIGHT: 293 LBS

## 2021-01-04 DIAGNOSIS — F33.42 RECURRENT MAJOR DEPRESSIVE DISORDER, IN FULL REMISSION (HCC): ICD-10-CM

## 2021-01-04 DIAGNOSIS — F41.9 ANXIETY: ICD-10-CM

## 2021-01-04 DIAGNOSIS — F90.0 ATTENTION DEFICIT HYPERACTIVITY DISORDER (ADHD), PREDOMINANTLY INATTENTIVE TYPE: Primary | ICD-10-CM

## 2021-01-04 PROCEDURE — 99214 OFFICE O/P EST MOD 30 MIN: CPT | Performed by: FAMILY MEDICINE

## 2021-01-04 RX ORDER — MULTIPLE VITAMINS W/ MINERALS TAB 9MG-400MCG
1 TAB ORAL DAILY
COMMUNITY

## 2021-01-04 NOTE — PROGRESS NOTES
Subjective   Toy Brown is a 25 y.o. female.     Chief Complaint   Patient presents with   • ADD     med refill      HPI  Patient is here for a checkup of her ADHD. Is working well with no side effects but could work better. Has been taking 60mg a day. Still figeting some.     Anxiety/depression- having a little more anxiety due to her new job but moods ok. Meds help. Sleep ok.     Considering pregnancy in the future.     The following portions of the patient's history were reviewed and updated as appropriate: allergies, current medications, past family history, past medical history, past social history, past surgical history and problem list.    Past Medical History:   Diagnosis Date   • Allergic    • Allergic rhinitis    • Anxiety    • Cough    • Depression    • Elevated BP without diagnosis of hypertension    • Headache, tension type, chronic    • Migraine    • Panic disorder    • Right otitis media    • Right wrist pain    • TMJ arthralgia        Past Surgical History:   Procedure Laterality Date   • APPENDECTOMY     • COLECTOMY PARTIAL / TOTAL     • TONSILLECTOMY         Family History   Problem Relation Age of Onset   • Diabetes Mother    • Heart disease Mother    • Hyperlipidemia Mother    • Cancer Mother    • Hyperlipidemia Father    • Heart disease Father    • Diabetes Maternal Grandmother    • Diabetes Maternal Grandfather    • Heart disease Paternal Grandmother        Social History     Socioeconomic History   • Marital status: Single     Spouse name: Not on file   • Number of children: Not on file   • Years of education: Not on file   • Highest education level: Not on file   Tobacco Use   • Smoking status: Never Smoker   • Smokeless tobacco: Never Used   Substance and Sexual Activity   • Alcohol use: No     Frequency: Never   • Drug use: No       Review of Systems   Constitutional: Negative for fever.   Cardiovascular: Negative for palpitations.   Psychiatric/Behavioral: Negative for sleep  "disturbance.       Objective   Visit Vitals  /88   Pulse 109   Temp 97.3 °F (36.3 °C) (Infrared)   Ht 175.3 cm (69\")   Wt 134 kg (295 lb)   SpO2 98%   BMI 43.56 kg/m²     Body mass index is 43.56 kg/m².  Physical Exam   Constitutional: She is oriented to person, place, and time. She appears well-developed and well-nourished.   Cardiovascular: Normal rate, regular rhythm and normal heart sounds.   Pulmonary/Chest: Effort normal and breath sounds normal.   Abdominal: Normal appearance.   Musculoskeletal: Normal range of motion. No swelling.   Neurological: She is alert and oriented to person, place, and time.   Skin: Skin is warm and dry.   Psychiatric: Her behavior is normal. Mood normal.         Assessment/Plan   Diagnoses and all orders for this visit:    1. Attention deficit hyperactivity disorder (ADHD), predominantly inattentive type (Primary)  -     lisdexamfetamine (Vyvanse) 50 MG capsule; Take 1 capsule by mouth Every Morning  Dispense: 30 capsule; Refill: 0    2. Recurrent major depressive disorder, in full remission (CMS/HCC)    3. Anxiety          Doing well, discussed risks and benefits of new med, follow-up in 3 months. Sincere. Discussed meds she would need to taper off before getting pregnant.      "

## 2021-01-08 ENCOUNTER — TELEPHONE (OUTPATIENT)
Dept: FAMILY MEDICINE CLINIC | Facility: CLINIC | Age: 26
End: 2021-01-08

## 2021-01-08 NOTE — TELEPHONE ENCOUNTER
PATIENT IS CALLING TO CHECK THE STATUS OF HER MEDICATION.  PATIENT STATES THAT HER INSURANCE IS ASKING FOR A PRIOR AUTH SINCE CHANGED FROM ADDERAL TO VYVANCE    PLEASE ADVISE    [phone]

## 2021-01-11 ENCOUNTER — TELEPHONE (OUTPATIENT)
Dept: FAMILY MEDICINE CLINIC | Facility: CLINIC | Age: 26
End: 2021-01-11

## 2021-01-11 NOTE — TELEPHONE ENCOUNTER
DELETE AFTER REVIEWING: Telephone encounter to be sent to the clinical pool     Caller: StephanieToy lucas    Relationship: Self    Best call back [phone]    What medications are you currently taking:   Current Outpatient Medications on File Prior to Visit   Medication Sig Dispense Refill   • buPROPion XL (Wellbutrin XL) 150 MG 24 hr tablet Take 1 tablet by mouth Daily. 90 tablet 3   • citalopram (CeleXA) 40 MG tablet Take 1 tablet by mouth Daily. 90 tablet 3   • cyclobenzaprine (FLEXERIL) 10 MG tablet Take 1 tablet by mouth 3 (Three) Times a Day As Needed for Muscle Spasms. (Patient taking differently: Take 10 mg by mouth 3 (Three) Times a Day As Needed for Muscle Spasms.) 90 tablet 1   • lisdexamfetamine (Vyvanse) 50 MG capsule Take 1 capsule by mouth Every Morning 30 capsule 0   • multivitamin with minerals (MULTIVITAMIN ADULT PO) Take 1 tablet by mouth Daily.     • traZODone (DESYREL) 50 MG tablet TAKE 1 TABLET BY MOUTH EVERY DAY AT NIGHT 90 tablet 1   • TRI-LO-TATA 0.18/0.215/0.25 MG-25 MCG per tablet Take 1 tablet by mouth Daily. 28 tablet 11     No current facility-administered medications on file prior to visit.              Who prescribed you this medication: Penny RAPP    What are your concerns: PATIENT WAS SWITCH FROM ADDERALL TO VYVANSE AND PATIENT INSURANCE WILL NOT COVER PRESCRIPTION. EVEN WITH THE COUPONS IT STILL WILL COST OVER $200. PATIENT CAN NOT AFFORD THAT CAN SHE BE SWITCHED TO A DIFFERENT MEDICATION OR BACK TO ADDERALL.

## 2021-01-12 DIAGNOSIS — F90.0 ATTENTION DEFICIT HYPERACTIVITY DISORDER (ADHD), PREDOMINANTLY INATTENTIVE TYPE: ICD-10-CM

## 2021-01-12 RX ORDER — DEXTROAMPHETAMINE SACCHARATE, AMPHETAMINE ASPARTATE MONOHYDRATE, DEXTROAMPHETAMINE SULFATE AND AMPHETAMINE SULFATE 7.5; 7.5; 7.5; 7.5 MG/1; MG/1; MG/1; MG/1
60 CAPSULE, EXTENDED RELEASE ORAL EVERY MORNING
Qty: 60 CAPSULE | Refills: 0 | Status: SHIPPED | OUTPATIENT
Start: 2021-01-12 | End: 2021-01-29 | Stop reason: SDUPTHER

## 2021-01-29 DIAGNOSIS — F90.0 ATTENTION DEFICIT HYPERACTIVITY DISORDER (ADHD), PREDOMINANTLY INATTENTIVE TYPE: ICD-10-CM

## 2021-01-29 RX ORDER — DEXTROAMPHETAMINE SACCHARATE, AMPHETAMINE ASPARTATE MONOHYDRATE, DEXTROAMPHETAMINE SULFATE AND AMPHETAMINE SULFATE 7.5; 7.5; 7.5; 7.5 MG/1; MG/1; MG/1; MG/1
60 CAPSULE, EXTENDED RELEASE ORAL EVERY MORNING
Qty: 60 CAPSULE | Refills: 0 | Status: SHIPPED | OUTPATIENT
Start: 2021-01-29 | End: 2021-03-09 | Stop reason: SDUPTHER

## 2021-01-29 NOTE — TELEPHONE ENCOUNTER
Caller: Toy Brown    Relationship: Self    Best call back number:[phone] ([phone]  Medication needed:   Requested Prescriptions     Pending Prescriptions Disp Refills   • amphetamine-dextroamphetamine XR (Adderall XR) 30 MG 24 hr capsule 60 capsule 0     Sig: Take 2 capsules by mouth Every Morning       When do you need the refill by: 01/29/21    What details did the patient provide when requesting the medication: PATIENT STATES SPOKE WITH PHARMACY AND HAS NOT RECEIVED REFILL REQUEST    Does the patient have less than a 3 day supply:  [x] Yes  [] No    What is the patient's preferred pharmacy: [facility]/PHARMACY #[id number] - [place], [place] - [address] [place] [address] AT [facility] - [phone]  - [phone] FX

## 2021-01-29 NOTE — TELEPHONE ENCOUNTER
LOV 1/4/21  Labs 2/18/20  Last RF 1/12/21  Pt called the pharmacy and they told her they never received the refill.

## 2021-03-09 ENCOUNTER — OFFICE VISIT (OUTPATIENT)
Dept: FAMILY MEDICINE CLINIC | Facility: CLINIC | Age: 26
End: 2021-03-09

## 2021-03-09 VITALS
DIASTOLIC BLOOD PRESSURE: 82 MMHG | HEART RATE: 81 BPM | WEIGHT: 293 LBS | BODY MASS INDEX: 43.4 KG/M2 | OXYGEN SATURATION: 98 % | HEIGHT: 69 IN | SYSTOLIC BLOOD PRESSURE: 126 MMHG | TEMPERATURE: 98 F

## 2021-03-09 DIAGNOSIS — F33.42 RECURRENT MAJOR DEPRESSIVE DISORDER, IN FULL REMISSION (HCC): ICD-10-CM

## 2021-03-09 DIAGNOSIS — F41.9 ANXIETY: ICD-10-CM

## 2021-03-09 DIAGNOSIS — F90.0 ATTENTION DEFICIT HYPERACTIVITY DISORDER (ADHD), PREDOMINANTLY INATTENTIVE TYPE: Primary | ICD-10-CM

## 2021-03-09 PROCEDURE — 99214 OFFICE O/P EST MOD 30 MIN: CPT | Performed by: FAMILY MEDICINE

## 2021-03-09 RX ORDER — SERTRALINE HYDROCHLORIDE 100 MG/1
100 TABLET, FILM COATED ORAL DAILY
Qty: 90 TABLET | Refills: 1 | Status: SHIPPED | OUTPATIENT
Start: 2021-03-09 | End: 2021-08-11 | Stop reason: SDUPTHER

## 2021-03-09 RX ORDER — DEXTROAMPHETAMINE SACCHARATE, AMPHETAMINE ASPARTATE MONOHYDRATE, DEXTROAMPHETAMINE SULFATE AND AMPHETAMINE SULFATE 7.5; 7.5; 7.5; 7.5 MG/1; MG/1; MG/1; MG/1
60 CAPSULE, EXTENDED RELEASE ORAL EVERY MORNING
Qty: 60 CAPSULE | Refills: 0 | Status: SHIPPED | OUTPATIENT
Start: 2021-03-09 | End: 2021-04-09 | Stop reason: SDUPTHER

## 2021-03-09 NOTE — PROGRESS NOTES
Subjective   Toy Brown is a 25 y.o. female.     Chief Complaint   Patient presents with   • Med Refill      HPI  Patient is here for a checkup of her ADHD. Is working well with no side effects but could work better. Has been taking 60mg a day. Still figeting some.     Anxiety/depression- having a little more anxiety due to her new job but moods ok. Meds help. Sleep ok. Weaned off wellbutrin and feels ok. Has a goal of pregnancy in the next year. Is less irritable.  Does not think Celexa is working well for her anymore and wants to change.      The following portions of the patient's history were reviewed and updated as appropriate: allergies, current medications, past family history, past medical history, past social history, past surgical history and problem list.    Past Medical History:   Diagnosis Date   • Allergic    • Allergic rhinitis    • Anxiety    • Cough    • Depression    • Elevated BP without diagnosis of hypertension    • Headache, tension type, chronic    • Migraine    • Panic disorder    • Right otitis media    • Right wrist pain    • TMJ arthralgia        Past Surgical History:   Procedure Laterality Date   • APPENDECTOMY     • COLECTOMY PARTIAL / TOTAL     • TONSILLECTOMY         Family History   Problem Relation Age of Onset   • Diabetes Mother    • Heart disease Mother    • Hyperlipidemia Mother    • Cancer Mother    • Hyperlipidemia Father    • Heart disease Father    • Diabetes Maternal Grandmother    • Diabetes Maternal Grandfather    • Heart disease Paternal Grandmother        Social History     Socioeconomic History   • Marital status: Single     Spouse name: Not on file   • Number of children: Not on file   • Years of education: Not on file   • Highest education level: Not on file   Tobacco Use   • Smoking status: Never Smoker   • Smokeless tobacco: Never Used   Substance and Sexual Activity   • Alcohol use: No   • Drug use: No       Review of Systems   Constitutional: Negative for  "fever.   Cardiovascular: Negative for palpitations.   Psychiatric/Behavioral: Negative for sleep disturbance.       Objective   Visit Vitals  /82 (BP Location: Left arm, Patient Position: Sitting)   Pulse 81   Temp 98 °F (36.7 °C)   Ht 175.3 cm (69.02\")   Wt 135 kg (297 lb)   SpO2 98%   BMI 43.84 kg/m²     Body mass index is 43.84 kg/m².  Physical Exam   Constitutional: She is oriented to person, place, and time. She appears well-developed and well-nourished.   Cardiovascular: Normal rate, regular rhythm and normal heart sounds.   Pulmonary/Chest: Effort normal and breath sounds normal.   Abdominal: Normal appearance.   Musculoskeletal: Normal range of motion. No swelling.   Neurological: She is alert and oriented to person, place, and time.   Skin: Skin is warm and dry.   Psychiatric: Her behavior is normal. Mood normal.         Assessment/Plan   Diagnoses and all orders for this visit:    1. Attention deficit hyperactivity disorder (ADHD), predominantly inattentive type (Primary)  -     amphetamine-dextroamphetamine XR (Adderall XR) 30 MG 24 hr capsule; Take 2 capsules by mouth Every Morning  Dispense: 60 capsule; Refill: 0    2. Anxiety    3. Recurrent major depressive disorder, in full remission (CMS/HCC)    Other orders  -     sertraline (Zoloft) 50 MG tablet; Take 1 tablet by mouth Daily.  Dispense: 14 tablet; Refill: 0  -     sertraline (Zoloft) 100 MG tablet; Take 1 tablet by mouth Daily.  Dispense: 90 tablet; Refill: 1          Doing well, discussed risks and benefits of new med.  Sincere.  Will come off the Celexa and then taper up on the Zoloft over a month.  Follow-up in 1 month.     "

## 2021-03-10 DIAGNOSIS — F51.05 INSOMNIA DUE TO OTHER MENTAL DISORDER: ICD-10-CM

## 2021-03-10 DIAGNOSIS — F99 INSOMNIA DUE TO OTHER MENTAL DISORDER: ICD-10-CM

## 2021-03-10 RX ORDER — TRAZODONE HYDROCHLORIDE 50 MG/1
TABLET ORAL
Qty: 180 TABLET | Refills: 1 | OUTPATIENT
Start: 2021-03-10

## 2021-04-09 ENCOUNTER — OFFICE VISIT (OUTPATIENT)
Dept: FAMILY MEDICINE CLINIC | Facility: CLINIC | Age: 26
End: 2021-04-09

## 2021-04-09 VITALS
DIASTOLIC BLOOD PRESSURE: 78 MMHG | HEIGHT: 69 IN | OXYGEN SATURATION: 98 % | HEART RATE: 111 BPM | WEIGHT: 283.6 LBS | SYSTOLIC BLOOD PRESSURE: 110 MMHG | TEMPERATURE: 97.1 F | BODY MASS INDEX: 42 KG/M2

## 2021-04-09 DIAGNOSIS — F41.9 ANXIETY: Primary | ICD-10-CM

## 2021-04-09 DIAGNOSIS — F99 INSOMNIA DUE TO OTHER MENTAL DISORDER: ICD-10-CM

## 2021-04-09 DIAGNOSIS — F51.05 INSOMNIA DUE TO OTHER MENTAL DISORDER: ICD-10-CM

## 2021-04-09 DIAGNOSIS — F90.0 ATTENTION DEFICIT HYPERACTIVITY DISORDER (ADHD), PREDOMINANTLY INATTENTIVE TYPE: ICD-10-CM

## 2021-04-09 DIAGNOSIS — F33.42 RECURRENT MAJOR DEPRESSIVE DISORDER, IN FULL REMISSION (HCC): ICD-10-CM

## 2021-04-09 PROCEDURE — 99214 OFFICE O/P EST MOD 30 MIN: CPT | Performed by: FAMILY MEDICINE

## 2021-04-09 RX ORDER — DEXTROAMPHETAMINE SACCHARATE, AMPHETAMINE ASPARTATE MONOHYDRATE, DEXTROAMPHETAMINE SULFATE AND AMPHETAMINE SULFATE 7.5; 7.5; 7.5; 7.5 MG/1; MG/1; MG/1; MG/1
60 CAPSULE, EXTENDED RELEASE ORAL EVERY MORNING
Qty: 60 CAPSULE | Refills: 0 | Status: SHIPPED | OUTPATIENT
Start: 2021-04-09 | End: 2021-06-08 | Stop reason: SDUPTHER

## 2021-04-09 NOTE — PROGRESS NOTES
Subjective   Toy Brown is a 25 y.o. female.     Chief Complaint   Patient presents with   • ADD       History of Present Illness   ADD- stable on meds. No se. Good focus.     Anxiety, depression, and insomnia-moods doing okay and sleeping fair.  At her last appointment we changed her medication to Zoloft as she was considering a pregnancy sometime in the next few years and the Celexa was not working for her.  This seems to be a good fit. Work stress makes her symptoms worse.     The following portions of the patient's history were reviewed and updated as appropriate: allergies, current medications, past family history, past medical history, past social history, past surgical history and problem list.    Past Medical History:   Diagnosis Date   • Allergic    • Allergic rhinitis    • Anxiety    • Cough    • Depression    • Elevated BP without diagnosis of hypertension    • Headache, tension type, chronic    • Migraine    • Panic disorder    • Right otitis media    • Right wrist pain    • TMJ arthralgia        Past Surgical History:   Procedure Laterality Date   • APPENDECTOMY     • COLECTOMY PARTIAL / TOTAL     • TONSILLECTOMY         Family History   Problem Relation Age of Onset   • Diabetes Mother    • Heart disease Mother    • Hyperlipidemia Mother    • Cancer Mother    • Hyperlipidemia Father    • Heart disease Father    • Diabetes Maternal Grandmother    • Diabetes Maternal Grandfather    • Heart disease Paternal Grandmother        Social History     Socioeconomic History   • Marital status: Single     Spouse name: Not on file   • Number of children: Not on file   • Years of education: Not on file   • Highest education level: Not on file   Tobacco Use   • Smoking status: Never Smoker   • Smokeless tobacco: Never Used   Substance and Sexual Activity   • Alcohol use: No   • Drug use: No       Review of Systems   Constitutional: Negative for fever.   Respiratory: Negative for shortness of breath.   "      Objective   Visit Vitals  /78 (BP Location: Left arm, Patient Position: Sitting)   Pulse 111   Temp 97.1 °F (36.2 °C)   Ht 175.3 cm (69.02\")   Wt 129 kg (283 lb 9.6 oz)   SpO2 98%   BMI 41.86 kg/m²     Body mass index is 41.86 kg/m².  Physical Exam  Constitutional:       Appearance: Normal appearance. She is well-developed.   Cardiovascular:      Rate and Rhythm: Normal rate and regular rhythm.      Heart sounds: Normal heart sounds.   Pulmonary:      Effort: Pulmonary effort is normal.      Breath sounds: Normal breath sounds.   Musculoskeletal:         General: No swelling. Normal range of motion.   Skin:     General: Skin is warm and dry.      Findings: No rash.   Neurological:      General: No focal deficit present.      Mental Status: She is alert and oriented to person, place, and time.   Psychiatric:         Mood and Affect: Mood normal.         Behavior: Behavior normal.           Assessment/Plan   Diagnoses and all orders for this visit:    1. Anxiety (Primary)    2. Attention deficit hyperactivity disorder (ADHD), predominantly inattentive type  -     amphetamine-dextroamphetamine XR (Adderall XR) 30 MG 24 hr capsule; Take 2 capsules by mouth Every Morning  Dispense: 60 capsule; Refill: 0    3. Insomnia due to other mental disorder    4. Recurrent major depressive disorder, in full remission (CMS/HCC)        Sincere, continue medicines and follow-up in 3 months.        "

## 2021-04-16 ENCOUNTER — BULK ORDERING (OUTPATIENT)
Dept: CASE MANAGEMENT | Facility: OTHER | Age: 26
End: 2021-04-16

## 2021-04-16 DIAGNOSIS — Z23 IMMUNIZATION DUE: ICD-10-CM

## 2021-06-08 DIAGNOSIS — F90.0 ATTENTION DEFICIT HYPERACTIVITY DISORDER (ADHD), PREDOMINANTLY INATTENTIVE TYPE: ICD-10-CM

## 2021-06-08 RX ORDER — DEXTROAMPHETAMINE SACCHARATE, AMPHETAMINE ASPARTATE MONOHYDRATE, DEXTROAMPHETAMINE SULFATE AND AMPHETAMINE SULFATE 7.5; 7.5; 7.5; 7.5 MG/1; MG/1; MG/1; MG/1
60 CAPSULE, EXTENDED RELEASE ORAL EVERY MORNING
Qty: 60 CAPSULE | Refills: 0 | Status: SHIPPED | OUTPATIENT
Start: 2021-06-08 | End: 2021-07-02 | Stop reason: SDUPTHER

## 2021-06-08 NOTE — TELEPHONE ENCOUNTER
Caller: Toy Brown    Relationship: Self    Best call back number: [phone]    Medication needed:   Requested Prescriptions     Pending Prescriptions Disp Refills   • amphetamine-dextroamphetamine XR (Adderall XR) 30 MG 24 hr capsule 60 capsule 0     Sig: Take 2 capsules by mouth Every Morning       When do you need the refill by: 6/10/2021    What additional details did the patient provide when requesting the medication: HAS ABOUT A WEEK OF MEDS LEFT.    Does the patient have less than a 3 day supply:  [] Yes  [x] No    What is the patient's preferred pharmacy: [facility]/PHARMACY #[id number] - [place], [place] - [address] [place] [address] AT [facility] - [phone]  - [phone] FX

## 2021-07-02 ENCOUNTER — OFFICE VISIT (OUTPATIENT)
Dept: FAMILY MEDICINE CLINIC | Facility: CLINIC | Age: 26
End: 2021-07-02

## 2021-07-02 VITALS
SYSTOLIC BLOOD PRESSURE: 138 MMHG | WEIGHT: 286.8 LBS | TEMPERATURE: 97.5 F | HEART RATE: 88 BPM | OXYGEN SATURATION: 99 % | BODY MASS INDEX: 42.48 KG/M2 | DIASTOLIC BLOOD PRESSURE: 80 MMHG | HEIGHT: 69 IN

## 2021-07-02 DIAGNOSIS — F33.42 RECURRENT MAJOR DEPRESSIVE DISORDER, IN FULL REMISSION (HCC): ICD-10-CM

## 2021-07-02 DIAGNOSIS — F51.05 INSOMNIA DUE TO OTHER MENTAL DISORDER: ICD-10-CM

## 2021-07-02 DIAGNOSIS — F41.9 ANXIETY: Primary | ICD-10-CM

## 2021-07-02 DIAGNOSIS — F99 INSOMNIA DUE TO OTHER MENTAL DISORDER: ICD-10-CM

## 2021-07-02 DIAGNOSIS — F90.0 ATTENTION DEFICIT HYPERACTIVITY DISORDER (ADHD), PREDOMINANTLY INATTENTIVE TYPE: ICD-10-CM

## 2021-07-02 PROCEDURE — 99214 OFFICE O/P EST MOD 30 MIN: CPT | Performed by: FAMILY MEDICINE

## 2021-07-02 RX ORDER — DEXTROAMPHETAMINE SACCHARATE, AMPHETAMINE ASPARTATE MONOHYDRATE, DEXTROAMPHETAMINE SULFATE AND AMPHETAMINE SULFATE 7.5; 7.5; 7.5; 7.5 MG/1; MG/1; MG/1; MG/1
60 CAPSULE, EXTENDED RELEASE ORAL EVERY MORNING
Qty: 60 CAPSULE | Refills: 0 | Status: SHIPPED | OUTPATIENT
Start: 2021-07-02 | End: 2021-08-11

## 2021-07-02 NOTE — PROGRESS NOTES
Subjective   Toy Brown is a 25 y.o. female.     Chief Complaint   Patient presents with   • ADD   • Anxiety   • blood work     check thyroid      HPI-  ADD- stable on meds. No se. Good focus.     Anxiety, depression, and insomnia-moods doing okay and sleeping fair but could sleep better-has tried taking 2 trazodone.  At her last appointment we changed her medication to Zoloft as she was considering a pregnancy sometime in the next few years and the Celexa was not working for her.  This seems to be a good fit. Work stress makes her symptoms worse. She is fine as long as working from home. Discussed needing to manage work stress.     The following portions of the patient's history were reviewed and updated as appropriate: allergies, current medications, past family history, past medical history, past social history, past surgical history and problem list.    Past Medical History:   Diagnosis Date   • Allergic    • Allergic rhinitis    • Anxiety    • Cough    • Depression    • Elevated BP without diagnosis of hypertension    • Headache, tension type, chronic    • Migraine    • Panic disorder    • Right otitis media    • Right wrist pain    • TMJ arthralgia        Past Surgical History:   Procedure Laterality Date   • APPENDECTOMY     • COLECTOMY PARTIAL / TOTAL     • TONSILLECTOMY         Family History   Problem Relation Age of Onset   • Diabetes Mother    • Heart disease Mother    • Hyperlipidemia Mother    • Cancer Mother    • Hyperlipidemia Father    • Heart disease Father    • Diabetes Maternal Grandmother    • Diabetes Maternal Grandfather    • Heart disease Paternal Grandmother        Social History     Socioeconomic History   • Marital status: Single     Spouse name: Not on file   • Number of children: Not on file   • Years of education: Not on file   • Highest education level: Not on file   Tobacco Use   • Smoking status: Never Smoker   • Smokeless tobacco: Never Used   Substance and Sexual Activity   •  "Alcohol use: No   • Drug use: No       Review of Systems   Constitutional: Negative for fever.   Respiratory: Negative for shortness of breath.        Objective   Visit Vitals  /80 (BP Location: Left arm, Patient Position: Sitting)   Pulse 88   Temp 97.5 °F (36.4 °C)   Ht 175.3 cm (69.02\")   Wt 130 kg (286 lb 12.8 oz)   SpO2 99%   BMI 42.33 kg/m²     Body mass index is 42.33 kg/m².  Physical Exam  Constitutional:       Appearance: Normal appearance. She is well-developed.   Cardiovascular:      Rate and Rhythm: Normal rate and regular rhythm.      Heart sounds: Normal heart sounds.   Pulmonary:      Effort: Pulmonary effort is normal.      Breath sounds: Normal breath sounds.   Musculoskeletal:         General: No swelling. Normal range of motion.   Skin:     General: Skin is warm and dry.      Findings: No rash.   Neurological:      General: No focal deficit present.      Mental Status: She is alert and oriented to person, place, and time.   Psychiatric:         Mood and Affect: Mood normal.         Behavior: Behavior normal.           Assessment/Plan   Diagnoses and all orders for this visit:    1. Anxiety (Primary)  -     TSH Rfx On Abnormal To Free T4    2. Attention deficit hyperactivity disorder (ADHD), predominantly inattentive type  -     amphetamine-dextroamphetamine XR (Adderall XR) 30 MG 24 hr capsule; Take 2 capsules by mouth Every Morning  Dispense: 60 capsule; Refill: 0    3. Insomnia due to other mental disorder    4. Recurrent major depressive disorder, in full remission (CMS/HCC)        Sincere, continue medicines and follow-up in 3 months. Will write note so she can work from home.        "

## 2021-07-03 LAB — TSH SERPL DL<=0.005 MIU/L-ACNC: 2.03 UIU/ML (ref 0.27–4.2)

## 2021-08-11 ENCOUNTER — OFFICE VISIT (OUTPATIENT)
Dept: FAMILY MEDICINE CLINIC | Facility: CLINIC | Age: 26
End: 2021-08-11

## 2021-08-11 VITALS
BODY MASS INDEX: 42.51 KG/M2 | WEIGHT: 287 LBS | SYSTOLIC BLOOD PRESSURE: 110 MMHG | TEMPERATURE: 97.8 F | HEIGHT: 69 IN | OXYGEN SATURATION: 98 % | HEART RATE: 94 BPM | DIASTOLIC BLOOD PRESSURE: 78 MMHG

## 2021-08-11 DIAGNOSIS — F33.42 RECURRENT MAJOR DEPRESSIVE DISORDER, IN FULL REMISSION (HCC): ICD-10-CM

## 2021-08-11 DIAGNOSIS — N91.2 AMENORRHEA: Primary | ICD-10-CM

## 2021-08-11 DIAGNOSIS — F90.0 ATTENTION DEFICIT HYPERACTIVITY DISORDER (ADHD), PREDOMINANTLY INATTENTIVE TYPE: ICD-10-CM

## 2021-08-11 DIAGNOSIS — F41.9 ANXIETY: ICD-10-CM

## 2021-08-11 DIAGNOSIS — Z3A.01 LESS THAN 8 WEEKS GESTATION OF PREGNANCY: ICD-10-CM

## 2021-08-11 DIAGNOSIS — F99 INSOMNIA DUE TO OTHER MENTAL DISORDER: ICD-10-CM

## 2021-08-11 DIAGNOSIS — F51.05 INSOMNIA DUE TO OTHER MENTAL DISORDER: ICD-10-CM

## 2021-08-11 LAB
B-HCG UR QL: POSITIVE
INTERNAL NEGATIVE CONTROL: NEGATIVE
INTERNAL POSITIVE CONTROL: POSITIVE
Lab: ABNORMAL

## 2021-08-11 PROCEDURE — 81025 URINE PREGNANCY TEST: CPT | Performed by: FAMILY MEDICINE

## 2021-08-11 PROCEDURE — 99214 OFFICE O/P EST MOD 30 MIN: CPT | Performed by: FAMILY MEDICINE

## 2021-08-11 RX ORDER — ONDANSETRON 4 MG/1
4 TABLET, FILM COATED ORAL EVERY 8 HOURS PRN
Qty: 30 TABLET | Refills: 1 | Status: SHIPPED | OUTPATIENT
Start: 2021-08-11

## 2021-08-11 RX ORDER — FAMOTIDINE 20 MG/1
20 TABLET, FILM COATED ORAL 2 TIMES DAILY PRN
Qty: 60 TABLET | Refills: 8 | Status: SHIPPED | OUTPATIENT
Start: 2021-08-11

## 2021-08-11 NOTE — PROGRESS NOTES
Subjective   Toy Brown is a 25 y.o. female.     Chief Complaint   Patient presents with   • pregnancy test       History of Present Illness   Patient has missed a period and has a positive pregnancy test confirmed at home.  Is here to confirm pregnancy and to discuss next steps.  Moods and ADD well controlled.LMP July 10th so she is at 4 weeks and 4 days. Taking a prenatal. Has gyn appt pending in 2 weeks. Pt is wanting to come off all meds. Pt is having some nausea and gerd and wanting meds.     The following portions of the patient's history were reviewed and updated as appropriate: allergies, current medications, past family history, past medical history, past social history, past surgical history and problem list.    Past Medical History:   Diagnosis Date   • ADHD (attention deficit hyperactivity disorder)    • Allergic    • Allergic rhinitis    • Anxiety    • Cough    • Depression    • Elevated BP without diagnosis of hypertension    • Headache, tension type, chronic    • Migraine    • Panic disorder    • Right otitis media    • Right wrist pain    • TMJ arthralgia        Past Surgical History:   Procedure Laterality Date   • APPENDECTOMY     • COLECTOMY PARTIAL / TOTAL     • TONSILLECTOMY         Family History   Problem Relation Age of Onset   • Diabetes Mother    • Heart disease Mother    • Hyperlipidemia Mother    • Cancer Mother    • Hyperlipidemia Father    • Heart disease Father    • Diabetes Maternal Grandmother    • Diabetes Maternal Grandfather    • Heart disease Paternal Grandmother        Social History     Socioeconomic History   • Marital status: Single     Spouse name: Not on file   • Number of children: Not on file   • Years of education: Not on file   • Highest education level: Not on file   Tobacco Use   • Smoking status: Never Smoker   • Smokeless tobacco: Never Used   Substance and Sexual Activity   • Alcohol use: No   • Drug use: No   • Sexual activity: Yes     Partners: Male      "Birth control/protection: None       Review of Systems   Constitutional: Negative for fever.   Respiratory: Negative for shortness of breath.        Objective   Visit Vitals  /78 (BP Location: Left arm, Patient Position: Sitting)   Pulse 94   Temp 97.8 °F (36.6 °C)   Ht 175.3 cm (69.02\")   Wt 130 kg (287 lb)   SpO2 98%   BMI 42.36 kg/m²     Body mass index is 42.36 kg/m².  Physical Exam  Constitutional:       Appearance: Normal appearance. She is well-developed.   Cardiovascular:      Rate and Rhythm: Normal rate and regular rhythm.      Heart sounds: Normal heart sounds.   Pulmonary:      Effort: Pulmonary effort is normal.      Breath sounds: Normal breath sounds.   Musculoskeletal:         General: No swelling. Normal range of motion.   Skin:     General: Skin is warm and dry.      Findings: No rash.   Neurological:      General: No focal deficit present.      Mental Status: She is alert and oriented to person, place, and time.   Psychiatric:         Mood and Affect: Mood normal.         Behavior: Behavior normal.           Assessment/Plan   Diagnoses and all orders for this visit:    1. Amenorrhea (Primary)  -     POC Pregnancy, Urine  -     ondansetron (Zofran) 4 MG tablet; Take 1 tablet by mouth Every 8 (Eight) Hours As Needed for Nausea or Vomiting.  Dispense: 30 tablet; Refill: 1  -     famotidine (Pepcid) 20 MG tablet; Take 1 tablet by mouth 2 (Two) Times a Day As Needed for Heartburn.  Dispense: 60 tablet; Refill: 8    2. Attention deficit hyperactivity disorder (ADHD), predominantly inattentive type    3. Anxiety  -     sertraline (Zoloft) 50 MG tablet; Take 1 tablet by mouth Daily.  Dispense: 30 tablet; Refill: 0    4. Insomnia due to other mental disorder    5. Recurrent major depressive disorder, in full remission (CMS/HCC)  -     sertraline (Zoloft) 50 MG tablet; Take 1 tablet by mouth Daily.  Dispense: 30 tablet; Refill: 0    6. Less than 8 weeks gestation of pregnancy      Will taper zoloft " and stop other meds per pt's choice. Discussed risks and benefits. Pt will journal and consider a therapist. F/U with gyn.

## 2021-09-02 DIAGNOSIS — F41.9 ANXIETY: ICD-10-CM

## 2021-09-02 DIAGNOSIS — F33.42 RECURRENT MAJOR DEPRESSIVE DISORDER, IN FULL REMISSION (HCC): ICD-10-CM

## 2021-09-07 RX ORDER — SERTRALINE HYDROCHLORIDE 100 MG/1
TABLET, FILM COATED ORAL
Qty: 90 TABLET | Refills: 1 | OUTPATIENT
Start: 2021-09-07

## 2021-09-28 DIAGNOSIS — F33.42 RECURRENT MAJOR DEPRESSIVE DISORDER, IN FULL REMISSION (HCC): ICD-10-CM

## 2021-09-28 DIAGNOSIS — F41.9 ANXIETY: ICD-10-CM

## 2021-11-02 DIAGNOSIS — F33.42 RECURRENT MAJOR DEPRESSIVE DISORDER, IN FULL REMISSION (HCC): ICD-10-CM

## 2021-11-02 DIAGNOSIS — F41.9 ANXIETY: ICD-10-CM

## 2021-11-02 NOTE — TELEPHONE ENCOUNTER
Rx Refill Note  Requested Prescriptions     Pending Prescriptions Disp Refills   • sertraline (ZOLOFT) 50 MG tablet 30 tablet 0     Sig: Take 1 tablet by mouth Daily.      Last office visit with prescribing clinician: 8/11/2021      Next office visit with prescribing clinician: Visit date not found            Loly Sanchez MA  11/02/21, 11:59 EDT

## 2021-11-30 DIAGNOSIS — F41.9 ANXIETY: ICD-10-CM

## 2021-11-30 DIAGNOSIS — F33.42 RECURRENT MAJOR DEPRESSIVE DISORDER, IN FULL REMISSION (HCC): ICD-10-CM

## 2022-11-23 ENCOUNTER — OFFICE VISIT (OUTPATIENT)
Dept: FAMILY MEDICINE CLINIC | Facility: CLINIC | Age: 27
End: 2022-11-23

## 2022-11-23 VITALS
HEIGHT: 69 IN | SYSTOLIC BLOOD PRESSURE: 125 MMHG | HEART RATE: 88 BPM | BODY MASS INDEX: 43.4 KG/M2 | WEIGHT: 293 LBS | DIASTOLIC BLOOD PRESSURE: 82 MMHG | TEMPERATURE: 97.3 F | OXYGEN SATURATION: 98 %

## 2022-11-23 DIAGNOSIS — F41.9 ANXIETY: Primary | ICD-10-CM

## 2022-11-23 DIAGNOSIS — F90.0 ATTENTION DEFICIT HYPERACTIVITY DISORDER (ADHD), PREDOMINANTLY INATTENTIVE TYPE: ICD-10-CM

## 2022-11-23 DIAGNOSIS — F33.42 RECURRENT MAJOR DEPRESSIVE DISORDER, IN FULL REMISSION: ICD-10-CM

## 2022-11-23 PROBLEM — Z3A.01 LESS THAN 8 WEEKS GESTATION OF PREGNANCY: Status: RESOLVED | Noted: 2021-08-11 | Resolved: 2022-11-23

## 2022-11-23 PROCEDURE — 99214 OFFICE O/P EST MOD 30 MIN: CPT | Performed by: FAMILY MEDICINE

## 2022-11-23 RX ORDER — VITAMIN A ACETATE, BETA CAROTENE, ASCORBIC ACID, CHOLECALCIFEROL, .ALPHA.-TOCOPHEROL ACETATE, DL-, THIAMINE MONONITRATE, RIBOFLAVIN, NIACINAMIDE, PYRIDOXINE HYDROCHLORIDE, FOLIC ACID, CYANOCOBALAMIN, CALCIUM CARBONATE, FERROUS FUMARATE, ZINC OXIDE, CUPRIC OXIDE 3080; 12; 120; 400; 1; 1.84; 3; 20; 22; 920; 25; 200; 27; 10; 2 [IU]/1; UG/1; MG/1; [IU]/1; MG/1; MG/1; MG/1; MG/1; MG/1; [IU]/1; MG/1; MG/1; MG/1; MG/1; MG/1
TABLET, FILM COATED ORAL DAILY
COMMUNITY

## 2022-11-23 RX ORDER — ACETAMINOPHEN 325 MG/1
TABLET ORAL
COMMUNITY

## 2022-11-23 RX ORDER — SERTRALINE HYDROCHLORIDE 100 MG/1
TABLET, FILM COATED ORAL
COMMUNITY
Start: 2022-11-20 | End: 2022-11-23 | Stop reason: SDUPTHER

## 2022-11-23 RX ORDER — DEXTROAMPHETAMINE SACCHARATE, AMPHETAMINE ASPARTATE MONOHYDRATE, DEXTROAMPHETAMINE SULFATE AND AMPHETAMINE SULFATE 5; 5; 5; 5 MG/1; MG/1; MG/1; MG/1
20 CAPSULE, EXTENDED RELEASE ORAL EVERY MORNING
Qty: 90 CAPSULE | Refills: 0 | Status: SHIPPED | OUTPATIENT
Start: 2022-11-23 | End: 2022-12-05 | Stop reason: SDUPTHER

## 2022-11-23 RX ORDER — SERTRALINE HYDROCHLORIDE 100 MG/1
100 TABLET, FILM COATED ORAL DAILY
Qty: 90 TABLET | Refills: 1 | Status: SHIPPED | OUTPATIENT
Start: 2022-11-23

## 2022-11-23 RX ORDER — ESOMEPRAZOLE MAGNESIUM 10 MG/1
GRANULE, FOR SUSPENSION, EXTENDED RELEASE ORAL
COMMUNITY

## 2022-11-28 DIAGNOSIS — F90.0 ATTENTION DEFICIT HYPERACTIVITY DISORDER (ADHD), PREDOMINANTLY INATTENTIVE TYPE: ICD-10-CM

## 2022-12-02 NOTE — TELEPHONE ENCOUNTER
Rx Refill Note  Requested Prescriptions     Pending Prescriptions Disp Refills   • amphetamine-dextroamphetamine XR (Adderall XR) 20 MG 24 hr capsule 90 capsule 0     Sig: Take 1 capsule by mouth Every Morning      Last office visit with prescribing clinician: 11/23/2022   Last telemedicine visit with prescribing clinician: 12/23/2022   Next office visit with prescribing clinician: 12/23/2022                         Would you like a call back once the refill request has been completed: [] Yes [] No    If the office needs to give you a call back, can they leave a voicemail: [] Yes [] No    Loly Sanchez MA  12/02/22, 14:30 EST

## 2022-12-02 NOTE — TELEPHONE ENCOUNTER
PATIENT IS CALLING BECAUSE THE PHARMACY HAD NEVER RECEIVED THIS BACK ON 11/23/22. PATIENT DOES NOT HAVE ANY OF THIS MEDICATION LEFT. PLEASE ADVISE: [phone]

## 2022-12-05 DIAGNOSIS — F90.0 ATTENTION DEFICIT HYPERACTIVITY DISORDER (ADHD), PREDOMINANTLY INATTENTIVE TYPE: ICD-10-CM

## 2022-12-05 RX ORDER — DEXTROAMPHETAMINE SACCHARATE, AMPHETAMINE ASPARTATE MONOHYDRATE, DEXTROAMPHETAMINE SULFATE AND AMPHETAMINE SULFATE 5; 5; 5; 5 MG/1; MG/1; MG/1; MG/1
20 CAPSULE, EXTENDED RELEASE ORAL EVERY MORNING
Qty: 90 CAPSULE | Refills: 0 | Status: SHIPPED | OUTPATIENT
Start: 2022-12-05 | End: 2022-12-23 | Stop reason: SDUPTHER

## 2022-12-05 RX ORDER — DEXTROAMPHETAMINE SACCHARATE, AMPHETAMINE ASPARTATE MONOHYDRATE, DEXTROAMPHETAMINE SULFATE AND AMPHETAMINE SULFATE 5; 5; 5; 5 MG/1; MG/1; MG/1; MG/1
20 CAPSULE, EXTENDED RELEASE ORAL EVERY MORNING
Qty: 90 CAPSULE | Refills: 0 | Status: SHIPPED | OUTPATIENT
Start: 2022-12-05

## 2022-12-23 ENCOUNTER — TELEMEDICINE (OUTPATIENT)
Dept: FAMILY MEDICINE CLINIC | Facility: CLINIC | Age: 27
End: 2022-12-23

## 2022-12-23 DIAGNOSIS — F90.0 ATTENTION DEFICIT HYPERACTIVITY DISORDER (ADHD), PREDOMINANTLY INATTENTIVE TYPE: ICD-10-CM

## 2022-12-23 PROCEDURE — 99214 OFFICE O/P EST MOD 30 MIN: CPT | Performed by: FAMILY MEDICINE

## 2022-12-23 NOTE — PROGRESS NOTES
Subjective   Toy NIÑO is a 27 y.o. female.     Chief Complaint   Patient presents with   • Anxiety       History of Present Illness   Depression and anxiety-patient has now been back on medication for 1 month. Doing better with moods but still having some anxiety. Is taking zoloft 150mg now.     ADD- patient has now been back on medicine for 1 month.  Her focus and ability to complete tasks is much improved on meds, no se as long as she takes the meds early enough.     The following portions of the patient's history were reviewed and updated as appropriate: allergies, current medications, past family history, past medical history, past social history, past surgical history and problem list.    Past Medical History:   Diagnosis Date   • ADHD (attention deficit hyperactivity disorder)    • Allergic    • Allergic rhinitis    • Anxiety    • Cough    • Depression    • Elevated BP without diagnosis of hypertension    • Headache, tension type, chronic    • Migraine    • Panic disorder    • Right otitis media    • Right wrist pain    • TMJ arthralgia        Past Surgical History:   Procedure Laterality Date   • APPENDECTOMY     • COLECTOMY PARTIAL / TOTAL     • TONSILLECTOMY         Family History   Problem Relation Age of Onset   • Diabetes Mother    • Heart disease Mother    • Hyperlipidemia Mother    • Cancer Mother    • Hyperlipidemia Father    • Heart disease Father    • Diabetes Maternal Grandmother    • Diabetes Maternal Grandfather    • Heart disease Paternal Grandmother        Social History     Socioeconomic History   • Marital status: Single   Tobacco Use   • Smoking status: Never   • Smokeless tobacco: Never   Substance and Sexual Activity   • Alcohol use: No   • Drug use: No   • Sexual activity: Yes     Partners: Male     Birth control/protection: None       Review of Systems   Constitutional: Negative for fever.   Respiratory: Negative for shortness of breath.    Cardiovascular: Negative for  palpitations.       Objective   There were no vitals taken for this visit.  There is no height or weight on file to calculate BMI.  Physical Exam  Constitutional:       General: She is not in acute distress.     Appearance: Normal appearance.   Neurological:      General: No focal deficit present.      Mental Status: She is alert and oriented to person, place, and time.   Psychiatric:         Mood and Affect: Mood normal.         Behavior: Behavior normal.           Assessment & Plan   Diagnoses and all orders for this visit:    1. Attention deficit hyperactivity disorder (ADHD), predominantly inattentive type      Doing better, cont meds, f/u in 3 months. Sincere not working. Discussed Changing her days to half at home and half at work. They moved her from working from home to in office and that has really increased her stress. She will discuss with her manager.  Consent to video visit secondary to the pandemic and spent 13 minutes.

## 2023-03-30 DIAGNOSIS — F90.0 ATTENTION DEFICIT HYPERACTIVITY DISORDER (ADHD), PREDOMINANTLY INATTENTIVE TYPE: ICD-10-CM

## 2023-03-30 RX ORDER — DEXTROAMPHETAMINE SACCHARATE, AMPHETAMINE ASPARTATE MONOHYDRATE, DEXTROAMPHETAMINE SULFATE AND AMPHETAMINE SULFATE 5; 5; 5; 5 MG/1; MG/1; MG/1; MG/1
20 CAPSULE, EXTENDED RELEASE ORAL EVERY MORNING
Qty: 90 CAPSULE | Refills: 0 | OUTPATIENT
Start: 2023-03-30

## 2023-03-30 NOTE — TELEPHONE ENCOUNTER
NOV NONE   LOV 12/23/2022  LR 12/5/2022    PATIENT WILL NEED AN APPT TO REFILL MEDICATION     LMTCB

## 2023-04-08 DIAGNOSIS — F90.0 ATTENTION DEFICIT HYPERACTIVITY DISORDER (ADHD), PREDOMINANTLY INATTENTIVE TYPE: ICD-10-CM

## 2023-04-10 RX ORDER — DEXTROAMPHETAMINE SACCHARATE, AMPHETAMINE ASPARTATE MONOHYDRATE, DEXTROAMPHETAMINE SULFATE AND AMPHETAMINE SULFATE 5; 5; 5; 5 MG/1; MG/1; MG/1; MG/1
20 CAPSULE, EXTENDED RELEASE ORAL EVERY MORNING
Qty: 90 CAPSULE | Refills: 0 | OUTPATIENT
Start: 2023-04-10

## 2023-04-12 DIAGNOSIS — F90.0 ATTENTION DEFICIT HYPERACTIVITY DISORDER (ADHD), PREDOMINANTLY INATTENTIVE TYPE: ICD-10-CM

## 2023-04-12 NOTE — TELEPHONE ENCOUNTER
Caller: Toy NIÑO    Relationship: Self    Best call back number:[phone]    Requested Prescriptions:   Requested Prescriptions     Pending Prescriptions Disp Refills   • amphetamine-dextroamphetamine XR (Adderall XR) 20 MG 24 hr capsule 90 capsule 0     Sig: Take 1 capsule by mouth Every Morning        Pharmacy where request should be sent: [facility]/PHARMACY #[id number] - [address] - [phone] [facility] [phone] FX     Last office visit with prescribing clinician: 11/23/2022   Last telemedicine visit with prescribing clinician: 6/19/2023   Next office visit with prescribing clinician: 6/19/2023     Additional details provided by patient: PATIENT WOULD LIKE TO KNOW IF SHE CAN BE SWITCHED TO THE REGULAR ADDERALL, NOT THE EXTENDED RELEASE AND TO UP THE MILLIGRAMS. PATIENT IS SCHEDULED WITH THE SOONEST TIME FOR A MED REFILL FOR DR. RAPP, AND SHE IS ALMOST OUT OF THIS MEDICATION. IF THIS REQUEST FOR THE NON XR ADDERALL IS NOT APPROVED, PLEASE SEND REGULAR PRESCRIPTION TO [facility]. PLEASE ADVISE ASAP.     Does the patient have less than a 3 day supply:  [x] Yes  [] No    Would you like a call back once the refill request has been completed: [] Yes [x] No    If the office needs to give you a call back, can they leave a voicemail: [] Yes [x] No    Lili Mcwilliams Rep   04/12/23 16:19 EDT

## 2023-04-14 RX ORDER — DEXTROAMPHETAMINE SACCHARATE, AMPHETAMINE ASPARTATE MONOHYDRATE, DEXTROAMPHETAMINE SULFATE AND AMPHETAMINE SULFATE 5; 5; 5; 5 MG/1; MG/1; MG/1; MG/1
20 CAPSULE, EXTENDED RELEASE ORAL EVERY MORNING
Qty: 30 CAPSULE | Refills: 0 | Status: SHIPPED | OUTPATIENT
Start: 2023-04-14 | End: 2023-06-19

## 2023-06-19 ENCOUNTER — OFFICE VISIT (OUTPATIENT)
Dept: FAMILY MEDICINE CLINIC | Facility: CLINIC | Age: 28
End: 2023-06-19
Payer: COMMERCIAL

## 2023-06-19 VITALS
DIASTOLIC BLOOD PRESSURE: 88 MMHG | SYSTOLIC BLOOD PRESSURE: 118 MMHG | HEART RATE: 78 BPM | WEIGHT: 293 LBS | HEIGHT: 69 IN | TEMPERATURE: 98.7 F | OXYGEN SATURATION: 99 % | BODY MASS INDEX: 43.4 KG/M2

## 2023-06-19 DIAGNOSIS — F99 INSOMNIA DUE TO OTHER MENTAL DISORDER: ICD-10-CM

## 2023-06-19 DIAGNOSIS — R06.83 SNORING: ICD-10-CM

## 2023-06-19 DIAGNOSIS — F41.9 ANXIETY: Primary | ICD-10-CM

## 2023-06-19 DIAGNOSIS — L21.9 SEBORRHEIC DERMATITIS: ICD-10-CM

## 2023-06-19 DIAGNOSIS — F51.05 INSOMNIA DUE TO OTHER MENTAL DISORDER: ICD-10-CM

## 2023-06-19 DIAGNOSIS — F33.42 RECURRENT MAJOR DEPRESSIVE DISORDER, IN FULL REMISSION: ICD-10-CM

## 2023-06-19 DIAGNOSIS — F90.0 ATTENTION DEFICIT HYPERACTIVITY DISORDER (ADHD), PREDOMINANTLY INATTENTIVE TYPE: ICD-10-CM

## 2023-06-19 PROCEDURE — 99214 OFFICE O/P EST MOD 30 MIN: CPT | Performed by: FAMILY MEDICINE

## 2023-06-19 RX ORDER — DEXTROAMPHETAMINE SACCHARATE, AMPHETAMINE ASPARTATE MONOHYDRATE, DEXTROAMPHETAMINE SULFATE AND AMPHETAMINE SULFATE 5; 5; 5; 5 MG/1; MG/1; MG/1; MG/1
20 CAPSULE, EXTENDED RELEASE ORAL EVERY MORNING
Qty: 30 CAPSULE | Refills: 0 | Status: CANCELLED | OUTPATIENT
Start: 2023-06-19

## 2023-06-19 RX ORDER — SERTRALINE HYDROCHLORIDE 100 MG/1
200 TABLET, FILM COATED ORAL DAILY
Qty: 180 TABLET | Refills: 1 | Status: SHIPPED | OUTPATIENT
Start: 2023-06-19

## 2023-06-19 RX ORDER — DEXTROAMPHETAMINE SACCHARATE, AMPHETAMINE ASPARTATE, DEXTROAMPHETAMINE SULFATE AND AMPHETAMINE SULFATE 2.5; 2.5; 2.5; 2.5 MG/1; MG/1; MG/1; MG/1
10 TABLET ORAL 2 TIMES DAILY
Qty: 60 TABLET | Refills: 0 | Status: SHIPPED | OUTPATIENT
Start: 2023-06-19

## 2023-06-19 RX ORDER — TRIAMCINOLONE ACETONIDE 1 MG/G
1 CREAM TOPICAL 2 TIMES DAILY
Qty: 80 G | Refills: 1 | Status: SHIPPED | OUTPATIENT
Start: 2023-06-19

## 2023-06-19 NOTE — PROGRESS NOTES
Subjective   Toy NIÑO is a 27 y.o. female.     Chief Complaint   Patient presents with    Anxiety       History of Present Illness   Depression and anxiety-patient has now been back on medication for months. Doing better with moods but still having some anxiety. Is taking zoloft 200mg now. Took herself up to 200mg.     ADD- patient has now been back on medicine for months.  Her focus and ability to complete tasks is much improved on meds, XR meds are keeping her from sleeping well.     Working hard on diet and exercise and has lost 10 pounds.     Itching at hairline with some irritation.     Having some morning headaches. Snoring at night. For months.     The following portions of the patient's history were reviewed and updated as appropriate: allergies, current medications, past family history, past medical history, past social history, past surgical history and problem list.    Past Medical History:   Diagnosis Date    ADHD (attention deficit hyperactivity disorder)     Allergic     Allergic rhinitis     Anxiety     Cough     Depression     Elevated BP without diagnosis of hypertension     Headache, tension type, chronic     Migraine     Panic disorder     Right otitis media     Right wrist pain     TMJ arthralgia        Past Surgical History:   Procedure Laterality Date    APPENDECTOMY       SECTION  2022    COLECTOMY PARTIAL / TOTAL      TONSILLECTOMY         Family History   Problem Relation Age of Onset    Diabetes Mother     Heart disease Mother     Hyperlipidemia Mother     Cancer Mother     Hyperlipidemia Father     Heart disease Father     Depression Father     Diabetes Maternal Grandmother     Diabetes Maternal Grandfather     Heart disease Paternal Grandmother     Cancer Paternal Grandfather        Social History     Socioeconomic History    Marital status: Single   Tobacco Use    Smoking status: Never    Smokeless tobacco: Never   Substance and Sexual Activity    Alcohol use: No     "Drug use: Never    Sexual activity: Not Currently     Partners: Male     Birth control/protection: None       Review of Systems   Constitutional:  Negative for fever.   Respiratory:  Negative for shortness of breath.    Cardiovascular:  Negative for palpitations.     Objective   Visit Vitals  /88 (BP Location: Left arm, Patient Position: Sitting)   Pulse 78   Temp 98.7 °F (37.1 °C)   Ht 175.3 cm (69.02\")   Wt (!) 136 kg (300 lb 3.2 oz)   SpO2 99%   BMI 44.31 kg/m²     Body mass index is 44.31 kg/m².  Physical Exam  Constitutional:       General: She is not in acute distress.     Appearance: Normal appearance.   Neurological:      General: No focal deficit present.      Mental Status: She is alert and oriented to person, place, and time.   Psychiatric:         Mood and Affect: Mood normal.         Behavior: Behavior normal.         Assessment & Plan   Diagnoses and all orders for this visit:    1. Anxiety (Primary)  -     sertraline (ZOLOFT) 100 MG tablet; Take 2 tablets by mouth Daily. Taking 2  Dispense: 180 tablet; Refill: 1    2. Recurrent major depressive disorder, in full remission  -     sertraline (ZOLOFT) 100 MG tablet; Take 2 tablets by mouth Daily. Taking 2  Dispense: 180 tablet; Refill: 1    3. Attention deficit hyperactivity disorder (ADHD), predominantly inattentive type  -     amphetamine-dextroamphetamine (Adderall) 10 MG tablet; Take 1 tablet by mouth 2 (Two) Times a Day.  Dispense: 60 tablet; Refill: 0    4. Insomnia due to other mental disorder    5. Seborrheic dermatitis  -     triamcinolone (KENALOG) 0.1 % cream; Apply 1 application topically to the appropriate area as directed 2 (Two) Times a Day.  Dispense: 80 g; Refill: 1    6. Snoring  -     Ambulatory Referral to Sleep Medicine      [facility], changed adderall to IR, cont other meds and f/u if worse or no better and in 3 months.          "

## 2023-09-06 ENCOUNTER — OFFICE VISIT (OUTPATIENT)
Dept: SLEEP MEDICINE | Facility: HOSPITAL | Age: 28
End: 2023-09-06
Payer: COMMERCIAL

## 2023-09-06 VITALS
HEIGHT: 69 IN | SYSTOLIC BLOOD PRESSURE: 123 MMHG | BODY MASS INDEX: 43.4 KG/M2 | OXYGEN SATURATION: 98 % | HEART RATE: 83 BPM | DIASTOLIC BLOOD PRESSURE: 65 MMHG | WEIGHT: 293 LBS

## 2023-09-06 DIAGNOSIS — Z82.0 FAMILY HISTORY OF SLEEP APNEA: ICD-10-CM

## 2023-09-06 DIAGNOSIS — E66.01 CLASS 3 SEVERE OBESITY WITH BODY MASS INDEX (BMI) OF 45.0 TO 49.9 IN ADULT, UNSPECIFIED OBESITY TYPE, UNSPECIFIED WHETHER SERIOUS COMORBIDITY PRESENT: ICD-10-CM

## 2023-09-06 DIAGNOSIS — R29.818 SUSPECTED SLEEP APNEA: ICD-10-CM

## 2023-09-06 DIAGNOSIS — R06.83 SNORING: Primary | ICD-10-CM

## 2023-09-06 PROCEDURE — G0463 HOSPITAL OUTPT CLINIC VISIT: HCPCS

## 2023-09-06 NOTE — PROGRESS NOTES
[facility]  [address]  [place] [address]  Phone   Fax       Toy NIÑO  [id number]   1995  28 y.o.  female      Referring Provider and PCP: Penny Barraza MD    Type of service: Initial Sleep Medicine Consult.  Date of service: 9/6/2023          CHIEF COMPLAINT: Suspected sleep apnea      HISTORY OF PRESENT ILLNESS:  Thank you for asking us to see Toy NIÑO.  Toy NIÑO 28 y.o. was seen today on 9/6/2023 at [facility].  Patient presents today with symptoms of snoring, non-restorative sleep, morning headaches, and suspected sleep apnea.  The symptoms are chronic and persistent in nature.  Patient has history of tonsillectomy around 2010 and nasal surgery around 2014.   She works second shift, 3pm-11:30pm.      SLEEP HISTORY:  Sleep schedule:  Bedtime: 11:30-midnight   Wake time: 5-6am   Normally takes 30-60 minutes to fall asleep  Average hours of sleep: 5  Number of naps per day: 2-3    Symptoms:   In addition to the above, patient reports the following associated symptoms:  Have you ever awakened gasping for breath, coughing, choking: No   Change in weight:  Yes, gained 30lb  Morning headaches:  Yes   Awaken with a sore throat or dry mouth:  Yes   Leg jerking at night:  No   Creepy crawly feeling in legs/urge to move legs: No   Teeth grinding: No   Have you ever awakened at night with a sour taste or burning sensation in your chest:  No   Do you have muscle weakness with laughing or anger:  No   Have you ever felt paralyzed while going to sleep or waking up:  No   Sleepwalking: No   Nightmares: No   Nocturia (urination at night): 0 times per night  Memory Problem: No     Medical Conditions (PMH):   Anxiety  Depression  ADHD  Trouble concentrating    Social history:  Do you drive a commercial vehicle:  No   Shift work:  Yes, second shift, 3pm-11:30pm  Tobacco use:  No   Alcohol use: 0 per week  Caffeinated  "drinks: 1-2 per day  Occupation: Supervisor    Family History (parents and siblings) (pertaining to sleep medicine):  Heart disease  Hypertension  Obesity  Diabetes  Sleep apnea  Renal cell carcinoma    Medications: reviewed    Allergies:  Augmentin [amoxicillin-pot clavulanate], Morphine, Percocet [oxycodone-acetaminophen], and Vantin [cefpodoxime]      REVIEW OF SYSTEMS:  Pertinent positive symptoms are:  Snoring  [place] Sleepiness Scale of Total score: 4   Fatigue  Joint pain  Anxiety  Depression  Heartburn        PHYSICAL EXAM:  CONSTITUTINONAL:   Vitals:    09/06/23 0700   BP: 123/65   Pulse: 83   SpO2: 98%   Weight: (!) 138 kg (304 lb 12.8 oz)   Height: 175.3 cm (69\")    Body mass index is 45.01 kg/m².   HEAD: atraumatic, normocephalic   THROAT: Mallampati class II-III  NECK: Neck Circumference: 17 inches, trachea is midline  RESPIRATORY SYSTEM: Respirations even, unlabored, normal rate  CARDIOVASULAR SYSTEM: Normal rate, no edema   NEUROLOGICAL SYSTEM: Alert and oriented x 3, normal gait  PSYCHIATRIC SYSTEM: Mood is normal/ appropriate     Office note(s) from care team reviewed. Office note(s) dated 6/19/2023, reviewed.    Labs/ Test Results Reviewed:     4/2022 CBC, CMP            ASSESSMENT AND PLAN:   Suspected sleep apnea: patient's symptoms and physical examination are concerning for possible sleep apnea (G47.30).   I discussed the signs, symptoms, and pathophysiology of sleep apnea with this patient.  I also discussed the potential complications of untreated sleep apnea including but not limited to resistant hypertension, insulin resistance, pulmonary hypertension, atrial fibrillation, stroke, nonrestorative sleep with hypersomnia which can increase risk for motor vehicle accidents, etc.   Different testing methods including home-based and lab based sleep studies were discussed with this patient.   Based on patient history and physical examination, will proceed with home sleep study.  The order for the " sleep study is placed in [facility].  The test will be scheduled after prior authorization has been obtained through patient's insurance.  Treatment and management will be discussed in more detail with this patient after the test is completed.  All questions were answered to patient's satisfaction.   Snoring (R06.83): snoring is the sound created by turbulent airflow vibrating upper airway soft tissue.  I have also discussed factors affecting snoring including sleep deprivation, sleeping on the back and alcohol ingestion. To minimize snoring, patient is advised to have adequate sleep, sleep on their side, and avoid alcohol and sedative medications around bedtime.   Daytime sleepiness: [place] Sleepiness Scale of Total score: 4.  There are many causes of daytime fatigue s including but not limited to sleep apnea, shiftwork syndrome, depression, and other medical disorders such as heart disease, kidney disease, and liver failure.  The most serious cause of excessive sleepiness is due to neurological conditions such as narcolepsy/cataplexy.  More commonly excessive sleepiness is caused by sleep apnea with frequent awakenings during sleep time.  I have discussed safety of driving and to remain vigilant while driving.  Obesity: Body mass index is 45.01 kg/m².. Patients who are overweight or obese are at increased risk of sleep apnea/ sleep disordered breathing. Weight reduction and healthy lifestyle are encouraged in overweight/ obese patients as part of a comprehensive approach to sleep apnea treatment.    Anxiety, depression, ADHD: PCP managing  Family history of sleep apnea    I have also discussed with the patient the following  Adequate amount of sleep: most people need around 7 to 8 hours of sleep each night      Patient will follow-up after study, 31 to 90 days after PAP therapy initiated if applicable, or contact the office sooner for questions or concerns. Patient's questions were answered.            Thank you again  for asking me to consult on this patient.  Please do not hesitate to call me if you have additional questions or concerns.       Steff Teresa DNP, APRN  [facility]

## 2023-09-18 DIAGNOSIS — F90.0 ATTENTION DEFICIT HYPERACTIVITY DISORDER (ADHD), PREDOMINANTLY INATTENTIVE TYPE: ICD-10-CM

## 2023-09-18 RX ORDER — DEXTROAMPHETAMINE SACCHARATE, AMPHETAMINE ASPARTATE, DEXTROAMPHETAMINE SULFATE AND AMPHETAMINE SULFATE 2.5; 2.5; 2.5; 2.5 MG/1; MG/1; MG/1; MG/1
10 TABLET ORAL 2 TIMES DAILY
Qty: 60 TABLET | Refills: 0 | Status: SHIPPED | OUTPATIENT
Start: 2023-09-18

## 2023-09-18 NOTE — TELEPHONE ENCOUNTER
Caller: Toy NIÑO    Relationship: Self    Best call back number: [phone]     Requested Prescriptions:   Requested Prescriptions     Pending Prescriptions Disp Refills    amphetamine-dextroamphetamine (Adderall) 10 MG tablet 60 tablet 0     Sig: Take 1 tablet by mouth 2 (Two) Times a Day.        Pharmacy where request should be sent: [facility]/PH[facility]ACY #[id number] - [address] AT [facility] - [phone]  - [phone] FX     Last office visit with prescribing clinician: 6/19/2023   Last telemedicine visit with prescribing clinician: Visit date not found   Next office visit with prescribing clinician: 12/4/2023     Additional details provided by patient: PATIENT HAD APPOINTMENT SCHEDULED FOR 9.18.23 BUT WAS ASKED TO RESCHEDULE    PATIENT HAS SOONEST AVAILABLE APPOINTMENT FOR 12.4.23 AND IS REQUESTING A REFILL ON THIS MEDICATION IN THE MEANTIME    Does the patient have less than a 3 day supply:  [] Yes  [x] No    Would you like a call back once the refill request has been completed: [] Yes [x] No    If the office needs to give you a call back, can they leave a voicemail: [] Yes [x] No    Lili Palomino Rep   09/18/23 08:47 EDT

## 2023-12-04 ENCOUNTER — OFFICE VISIT (OUTPATIENT)
Dept: FAMILY MEDICINE CLINIC | Facility: CLINIC | Age: 28
End: 2023-12-04
Payer: COMMERCIAL

## 2023-12-04 VITALS
DIASTOLIC BLOOD PRESSURE: 88 MMHG | WEIGHT: 293 LBS | HEIGHT: 69 IN | HEART RATE: 97 BPM | SYSTOLIC BLOOD PRESSURE: 140 MMHG | TEMPERATURE: 97.8 F | OXYGEN SATURATION: 98 % | BODY MASS INDEX: 43.4 KG/M2

## 2023-12-04 DIAGNOSIS — Z79.899 HIGH RISK MEDICATION USE: ICD-10-CM

## 2023-12-04 DIAGNOSIS — F41.9 ANXIETY: Primary | ICD-10-CM

## 2023-12-04 DIAGNOSIS — F33.42 RECURRENT MAJOR DEPRESSIVE DISORDER, IN FULL REMISSION: ICD-10-CM

## 2023-12-04 DIAGNOSIS — F90.0 ATTENTION DEFICIT HYPERACTIVITY DISORDER (ADHD), PREDOMINANTLY INATTENTIVE TYPE: ICD-10-CM

## 2023-12-04 PROCEDURE — 99214 OFFICE O/P EST MOD 30 MIN: CPT | Performed by: FAMILY MEDICINE

## 2023-12-04 RX ORDER — DEXTROAMPHETAMINE SACCHARATE, AMPHETAMINE ASPARTATE, DEXTROAMPHETAMINE SULFATE AND AMPHETAMINE SULFATE 3.75; 3.75; 3.75; 3.75 MG/1; MG/1; MG/1; MG/1
15 TABLET ORAL 2 TIMES DAILY
Qty: 60 TABLET | Refills: 0 | Status: SHIPPED | OUTPATIENT
Start: 2024-01-29 | End: 2024-02-28

## 2023-12-04 RX ORDER — SERTRALINE HYDROCHLORIDE 100 MG/1
200 TABLET, FILM COATED ORAL DAILY
Qty: 180 TABLET | Refills: 1 | Status: SHIPPED | OUTPATIENT
Start: 2023-12-04

## 2023-12-04 RX ORDER — DEXTROAMPHETAMINE SACCHARATE, AMPHETAMINE ASPARTATE, DEXTROAMPHETAMINE SULFATE AND AMPHETAMINE SULFATE 3.75; 3.75; 3.75; 3.75 MG/1; MG/1; MG/1; MG/1
15 TABLET ORAL 2 TIMES DAILY
Qty: 60 TABLET | Refills: 0 | Status: SHIPPED | OUTPATIENT
Start: 2023-12-04 | End: 2024-01-03

## 2023-12-04 RX ORDER — DEXTROAMPHETAMINE SACCHARATE, AMPHETAMINE ASPARTATE, DEXTROAMPHETAMINE SULFATE AND AMPHETAMINE SULFATE 3.75; 3.75; 3.75; 3.75 MG/1; MG/1; MG/1; MG/1
15 TABLET ORAL 2 TIMES DAILY
Qty: 60 TABLET | Refills: 0 | Status: SHIPPED | OUTPATIENT
Start: 2024-01-01 | End: 2024-01-31

## 2023-12-04 RX ORDER — ESOMEPRAZOLE MAGNESIUM 10 MG/1
10 GRANULE, FOR SUSPENSION, EXTENDED RELEASE ORAL
Qty: 90 EACH | Refills: 3 | Status: SHIPPED | OUTPATIENT
Start: 2023-12-04

## 2023-12-04 RX ORDER — DEXTROAMPHETAMINE SACCHARATE, AMPHETAMINE ASPARTATE, DEXTROAMPHETAMINE SULFATE AND AMPHETAMINE SULFATE 2.5; 2.5; 2.5; 2.5 MG/1; MG/1; MG/1; MG/1
10 TABLET ORAL 2 TIMES DAILY
Qty: 60 TABLET | Refills: 0 | Status: CANCELLED | OUTPATIENT
Start: 2023-12-04

## 2023-12-04 NOTE — PROGRESS NOTES
Subjective   Toy NIÑO is a 28 y.o. female.     Chief Complaint   Patient presents with    Anxiety       History of Present Illness   Depression and anxiety-Doing better with moods but still having some anxiety. Is taking zoloft 200mg now. Was going to talk to her manager about working some from home to help with her stress. Today her phq9 is 10 and pt reports feeling much better.     ADD-   Her focus and ability to complete tasks is much improved on meds, no se as long as she takes the meds early enough. Could work a little better.     The following portions of the patient's history were reviewed and updated as appropriate: allergies, current medications, past family history, past medical history, past social history, past surgical history and problem list.    Past Medical History:   Diagnosis Date    ADHD (attention deficit hyperactivity disorder)     Allergic     Allergic rhinitis     Anxiety     Cough     Depression     Elevated BP without diagnosis of hypertension     Headache, tension type, chronic     Migraine     Panic disorder     Right otitis media     Right wrist pain     TMJ arthralgia        Past Surgical History:   Procedure Laterality Date    APPENDECTOMY       SECTION  2022    COLECTOMY PARTIAL / TOTAL      TONSILLECTOMY         Family History   Problem Relation Age of Onset    Diabetes Mother     Heart disease Mother     Hyperlipidemia Mother     Cancer Mother     Hypertension Mother     Hyperlipidemia Father     Heart disease Father     Depression Father     Hypertension Father     Diabetes Maternal Grandmother     Stroke Maternal Grandmother     Diabetes Maternal Grandfather     Heart disease Paternal Grandmother     Cancer Paternal Grandfather        Social History     Socioeconomic History    Marital status: Single   Tobacco Use    Smoking status: Never    Smokeless tobacco: Never   Substance and Sexual Activity    Alcohol use: No    Drug use: Never    Sexual activity: Not  "Currently     Partners: Male     Birth control/protection: None       Review of Systems   Constitutional:  Negative for fever.   Respiratory:  Negative for shortness of breath.    Cardiovascular:  Negative for palpitations.       Objective   Visit Vitals  /88 (BP Location: Right arm, Patient Position: Sitting, Cuff Size: Adult)   Pulse 97   Temp 97.8 °F (36.6 °C)   Ht 175.3 cm (69.02\")   Wt (!) 138 kg (304 lb 3.2 oz)   LMP 11/20/2023 (Approximate)   SpO2 98%   Breastfeeding No   BMI 44.90 kg/m²     Body mass index is 44.9 kg/m².  Physical Exam  Constitutional:       General: She is not in acute distress.     Appearance: Normal appearance.   Neurological:      General: No focal deficit present.      Mental Status: She is alert and oriented to person, place, and time.   Psychiatric:         Mood and Affect: Mood normal.         Behavior: Behavior normal.           Assessment & Plan   Diagnoses and all orders for this visit:    1. Anxiety (Primary)  -     sertraline (ZOLOFT) 100 MG tablet; Take 2 tablets by mouth Daily. Taking 2  Dispense: 180 tablet; Refill: 1    2. Recurrent major depressive disorder, in full remission  -     sertraline (ZOLOFT) 100 MG tablet; Take 2 tablets by mouth Daily. Taking 2  Dispense: 180 tablet; Refill: 1    3. Attention deficit hyperactivity disorder (ADHD), predominantly inattentive type  -     amphetamine-dextroamphetamine (Adderall) 15 MG tablet; Take 1 tablet by mouth 2 (Two) Times a Day for 30 days.  Dispense: 60 tablet; Refill: 0  -     amphetamine-dextroamphetamine (Adderall) 15 MG tablet; Take 1 tablet by mouth 2 (Two) Times a Day for 30 days.  Dispense: 60 tablet; Refill: 0  -     amphetamine-dextroamphetamine (Adderall) 15 MG tablet; Take 1 tablet by mouth 2 (Two) Times a Day for 30 days.  Dispense: 60 tablet; Refill: 0    4. High risk medication use  -     Compliance Drug Analysis, Ur - Urine, Clean Catch    Other orders  -     esomeprazole (NexIUM) 10 MG packet; Take 10 " mg by mouth Every Morning Before Breakfast.  Dispense: 90 each; Refill: 3        Doing better, increased adderal, cont meds, f/u in 3 months. Debi Rodríguez and renew contract.

## 2023-12-10 LAB — DRUGS UR: NORMAL

## 2024-03-19 DIAGNOSIS — L21.9 SEBORRHEIC DERMATITIS: ICD-10-CM

## 2024-03-19 RX ORDER — TRIAMCINOLONE ACETONIDE 1 MG/G
1 CREAM TOPICAL 2 TIMES DAILY
Qty: 60 G | Refills: 1 | Status: SHIPPED | OUTPATIENT
Start: 2024-03-19

## 2024-03-20 ENCOUNTER — OFFICE VISIT (OUTPATIENT)
Dept: FAMILY MEDICINE CLINIC | Facility: CLINIC | Age: 29
End: 2024-03-20
Payer: COMMERCIAL

## 2024-03-20 VITALS
HEART RATE: 75 BPM | TEMPERATURE: 97.5 F | OXYGEN SATURATION: 99 % | DIASTOLIC BLOOD PRESSURE: 77 MMHG | SYSTOLIC BLOOD PRESSURE: 124 MMHG | WEIGHT: 293 LBS | BODY MASS INDEX: 43.4 KG/M2 | HEIGHT: 69 IN

## 2024-03-20 DIAGNOSIS — F90.0 ATTENTION DEFICIT HYPERACTIVITY DISORDER (ADHD), PREDOMINANTLY INATTENTIVE TYPE: ICD-10-CM

## 2024-03-20 DIAGNOSIS — K21.9 GASTROESOPHAGEAL REFLUX DISEASE WITHOUT ESOPHAGITIS: ICD-10-CM

## 2024-03-20 DIAGNOSIS — F41.9 ANXIETY: ICD-10-CM

## 2024-03-20 DIAGNOSIS — F33.42 RECURRENT MAJOR DEPRESSIVE DISORDER, IN FULL REMISSION: Primary | ICD-10-CM

## 2024-03-20 PROCEDURE — 99214 OFFICE O/P EST MOD 30 MIN: CPT | Performed by: FAMILY MEDICINE

## 2024-03-20 RX ORDER — ESOMEPRAZOLE MAGNESIUM 40 MG/1
40 CAPSULE, DELAYED RELEASE ORAL
Qty: 90 CAPSULE | Refills: 3 | Status: SHIPPED | OUTPATIENT
Start: 2024-03-20

## 2024-03-20 RX ORDER — DEXTROAMPHETAMINE SACCHARATE, AMPHETAMINE ASPARTATE, DEXTROAMPHETAMINE SULFATE AND AMPHETAMINE SULFATE 2.5; 2.5; 2.5; 2.5 MG/1; MG/1; MG/1; MG/1
10 TABLET ORAL 2 TIMES DAILY
Qty: 60 TABLET | Refills: 0 | Status: SHIPPED | OUTPATIENT
Start: 2024-03-20

## 2024-03-20 RX ORDER — BUPROPION HYDROCHLORIDE 150 MG/1
150 TABLET ORAL DAILY
Qty: 90 TABLET | Refills: 1 | Status: SHIPPED | OUTPATIENT
Start: 2024-03-20

## 2024-03-20 NOTE — PROGRESS NOTES
Subjective   Toy NIÑO is a 28 y.o. female.     Chief Complaint   Patient presents with    Anxiety     F/u- wants to talk about other medication options       History of Present Illness   Depression and anxiety-Doing better with moods but still having some anxiety and depressed moods. Is taking zoloft 200mg now. Was going to talk to her manager about working some from home to help with her stress, she now gets 2 days a week at home. This helps some. . Today her phq9 is 11. This is stable. She is maxed out on current therapy and wanting to try something else. Does not want to stop zoloft.     ADD-   Her focus and ability to complete tasks is much improved on meds, no se as long as she takes the meds early enough. Feels good at this dose.     The following portions of the patient's history were reviewed and updated as appropriate: allergies, current medications, past family history, past medical history, past social history, past surgical history and problem list.    Past Medical History:   Diagnosis Date    ADHD (attention deficit hyperactivity disorder)     Allergic     Allergic rhinitis     Anxiety     Cough     Depression     Elevated BP without diagnosis of hypertension     Headache, tension type, chronic     Low back pain     Migraine     Panic disorder     Right otitis media     Right wrist pain     TMJ arthralgia        Past Surgical History:   Procedure Laterality Date    APPENDECTOMY       SECTION  2022    COLECTOMY PARTIAL / TOTAL      COLON SURGERY      SINUS SURGERY      TONSILLECTOMY         Family History   Problem Relation Age of Onset    Diabetes Mother     Heart disease Mother     Hyperlipidemia Mother     Cancer Mother     Hypertension Mother     Hyperlipidemia Father     Heart disease Father     Depression Father     Hypertension Father     Diabetes Maternal Grandmother     Stroke Maternal Grandmother     Diabetes Maternal Grandfather     Heart disease Paternal Grandmother      "Cancer Paternal Grandfather        Social History     Socioeconomic History    Marital status: Single   Tobacco Use    Smoking status: Never    Smokeless tobacco: Never   Substance and Sexual Activity    Alcohol use: No    Drug use: Never    Sexual activity: Not Currently     Partners: Male     Birth control/protection: None       Review of Systems   Constitutional:  Negative for fever.   Respiratory:  Negative for shortness of breath.    Cardiovascular:  Negative for palpitations.       Objective   Visit Vitals  /77 (BP Location: Left arm, Patient Position: Sitting, Cuff Size: Large Adult)   Pulse 75   Temp 97.5 °F (36.4 °C)   Ht 175.3 cm (69.02\")   Wt (!) 139 kg (307 lb 6.4 oz)   LMP 03/04/2024   SpO2 99%   BMI 45.37 kg/m²       Body mass index is 45.37 kg/m².  Physical Exam  Constitutional:       General: She is not in acute distress.     Appearance: Normal appearance.   Neurological:      General: No focal deficit present.      Mental Status: She is alert and oriented to person, place, and time.   Psychiatric:         Mood and Affect: Mood normal.         Behavior: Behavior normal.           Assessment & Plan   Diagnoses and all orders for this visit:    1. Recurrent major depressive disorder, in full remission (Primary)  -     buPROPion XL (Wellbutrin XL) 150 MG 24 hr tablet; Take 1 tablet by mouth Daily.  Dispense: 90 tablet; Refill: 1    2. Attention deficit hyperactivity disorder (ADHD), predominantly inattentive type  -     amphetamine-dextroamphetamine (Adderall) 10 MG tablet; Take 1 tablet by mouth 2 (Two) Times a Day.  Dispense: 60 tablet; Refill: 0    3. Anxiety    4. Gastroesophageal reflux disease without esophagitis  -     esomeprazole (nexIUM) 40 MG capsule; Take 1 capsule by mouth Every Morning Before Breakfast.  Dispense: 90 capsule; Refill: 3          Doing better, added in wellbutrin, cont meds, f/u in 3 months. Sincere. Discussed risks and benefits of medication.          "

## 2024-05-06 DIAGNOSIS — K21.9 GASTROESOPHAGEAL REFLUX DISEASE WITHOUT ESOPHAGITIS: ICD-10-CM

## 2024-05-06 RX ORDER — ESOMEPRAZOLE MAGNESIUM 40 MG/1
40 CAPSULE, DELAYED RELEASE ORAL
Qty: 90 CAPSULE | Refills: 3 | Status: SHIPPED | OUTPATIENT
Start: 2024-05-06

## 2024-05-19 DIAGNOSIS — F41.9 ANXIETY: ICD-10-CM

## 2024-05-19 DIAGNOSIS — F33.42 RECURRENT MAJOR DEPRESSIVE DISORDER, IN FULL REMISSION: ICD-10-CM

## 2024-05-20 RX ORDER — SERTRALINE HYDROCHLORIDE 100 MG/1
200 TABLET, FILM COATED ORAL DAILY
Qty: 180 TABLET | Refills: 1 | Status: SHIPPED | OUTPATIENT
Start: 2024-05-20

## 2024-06-19 ENCOUNTER — OFFICE VISIT (OUTPATIENT)
Dept: FAMILY MEDICINE CLINIC | Facility: CLINIC | Age: 29
End: 2024-06-19
Payer: COMMERCIAL

## 2024-06-19 VITALS
BODY MASS INDEX: 43.4 KG/M2 | SYSTOLIC BLOOD PRESSURE: 112 MMHG | HEART RATE: 89 BPM | HEIGHT: 69 IN | DIASTOLIC BLOOD PRESSURE: 78 MMHG | OXYGEN SATURATION: 96 % | TEMPERATURE: 97.7 F | WEIGHT: 293 LBS

## 2024-06-19 DIAGNOSIS — F99 INSOMNIA DUE TO OTHER MENTAL DISORDER: ICD-10-CM

## 2024-06-19 DIAGNOSIS — F41.9 ANXIETY: Primary | ICD-10-CM

## 2024-06-19 DIAGNOSIS — F51.05 INSOMNIA DUE TO OTHER MENTAL DISORDER: ICD-10-CM

## 2024-06-19 DIAGNOSIS — F90.0 ATTENTION DEFICIT HYPERACTIVITY DISORDER (ADHD), PREDOMINANTLY INATTENTIVE TYPE: ICD-10-CM

## 2024-06-19 DIAGNOSIS — F33.42 RECURRENT MAJOR DEPRESSIVE DISORDER, IN FULL REMISSION: ICD-10-CM

## 2024-06-19 PROCEDURE — 99214 OFFICE O/P EST MOD 30 MIN: CPT | Performed by: FAMILY MEDICINE

## 2024-06-19 RX ORDER — CLOBETASOL PROPIONATE 0.5 MG/G
CREAM TOPICAL
COMMUNITY
Start: 2024-05-02

## 2024-06-19 RX ORDER — DEXTROAMPHETAMINE SACCHARATE, AMPHETAMINE ASPARTATE, DEXTROAMPHETAMINE SULFATE AND AMPHETAMINE SULFATE 2.5; 2.5; 2.5; 2.5 MG/1; MG/1; MG/1; MG/1
10 TABLET ORAL 2 TIMES DAILY
Qty: 60 TABLET | Refills: 0 | Status: SHIPPED | OUTPATIENT
Start: 2024-06-19

## 2024-06-19 NOTE — PROGRESS NOTES
Subjective   Toy NIÑO is a 28 y.o. female.     Chief Complaint   Patient presents with    Anxiety    Depression       History of Present Illness   Depression and anxiety-Doing better with moods but still having some anxiety and depressed moods. Is taking zoloft 200mg now. Was going to talk to her manager about working some from home to help with her stress, she now gets 2 days a week at home. This helps some.  She is maxed out on current therapy and wanting to try something else. Does not want to stop zoloft. 3 months ago we added in wellbutrin. Thinks her moods are much better. Likes her current regimen.     ADD-   Her focus and ability to complete tasks is much improved on meds, no se as long as she takes the meds early enough. Feels good at this dose.     The following portions of the patient's history were reviewed and updated as appropriate: allergies, current medications, past family history, past medical history, past social history, past surgical history and problem list.    Past Medical History:   Diagnosis Date    ADHD (attention deficit hyperactivity disorder)     Allergic     Allergic rhinitis     Anxiety     Cough     Depression     Elevated BP without diagnosis of hypertension     Headache, tension type, chronic     Low back pain     Migraine     Panic disorder     Right otitis media     Right wrist pain     TMJ arthralgia        Past Surgical History:   Procedure Laterality Date    APPENDECTOMY       SECTION  2022    COLECTOMY PARTIAL / TOTAL      COLON SURGERY      SINUS SURGERY      TONSILLECTOMY         Family History   Problem Relation Age of Onset    Diabetes Mother     Heart disease Mother     Hyperlipidemia Mother     Cancer Mother     Hypertension Mother     Hyperlipidemia Father     Heart disease Father     Depression Father     Hypertension Father     Diabetes Maternal Grandmother     Stroke Maternal Grandmother     Diabetes Maternal Grandfather     Heart disease Paternal  "Grandmother     Cancer Paternal Grandfather        Social History     Socioeconomic History    Marital status: Single   Tobacco Use    Smoking status: Never    Smokeless tobacco: Never   Substance and Sexual Activity    Alcohol use: No    Drug use: Never    Sexual activity: Not Currently     Partners: Male     Birth control/protection: None       Review of Systems   Constitutional:  Negative for fever.   Respiratory:  Negative for shortness of breath.    Cardiovascular:  Negative for palpitations.       Objective   Visit Vitals  /78 (BP Location: Left arm, Patient Position: Sitting, Cuff Size: Large Adult)   Pulse 89   Temp 97.7 °F (36.5 °C)   Ht 175.3 cm (69.02\")   Wt (!) 139 kg (305 lb 6.4 oz)   LMP 06/03/2024 (Approximate)   SpO2 96%   BMI 45.08 kg/m²         Body mass index is 45.08 kg/m².  Physical Exam  Constitutional:       General: She is not in acute distress.     Appearance: Normal appearance.   Neurological:      General: No focal deficit present.      Mental Status: She is alert and oriented to person, place, and time.   Psychiatric:         Mood and Affect: Mood normal.         Behavior: Behavior normal.           Assessment & Plan   Diagnoses and all orders for this visit:    1. Anxiety (Primary)    2. Recurrent major depressive disorder, in full remission    3. Attention deficit hyperactivity disorder (ADHD), predominantly inattentive type  -     amphetamine-dextroamphetamine (Adderall) 10 MG tablet; Take 1 tablet by mouth 2 (Two) Times a Day.  Dispense: 60 tablet; Refill: 0    4. Insomnia due to other mental disorder            cont meds, f/u in 3 months. Sincere.   Pt has a lymph node that has been swelling and it has gone down today. If this recurs she will call me for u/s.        "

## 2024-09-25 ENCOUNTER — OFFICE VISIT (OUTPATIENT)
Dept: FAMILY MEDICINE CLINIC | Facility: CLINIC | Age: 29
End: 2024-09-25
Payer: COMMERCIAL

## 2024-09-25 VITALS
DIASTOLIC BLOOD PRESSURE: 80 MMHG | BODY MASS INDEX: 43.4 KG/M2 | TEMPERATURE: 97.9 F | HEIGHT: 69 IN | SYSTOLIC BLOOD PRESSURE: 118 MMHG | OXYGEN SATURATION: 96 % | HEART RATE: 72 BPM | WEIGHT: 293 LBS

## 2024-09-25 DIAGNOSIS — F33.42 RECURRENT MAJOR DEPRESSIVE DISORDER, IN FULL REMISSION: ICD-10-CM

## 2024-09-25 DIAGNOSIS — F51.05 INSOMNIA DUE TO OTHER MENTAL DISORDER: ICD-10-CM

## 2024-09-25 DIAGNOSIS — F99 INSOMNIA DUE TO OTHER MENTAL DISORDER: ICD-10-CM

## 2024-09-25 DIAGNOSIS — F41.9 ANXIETY: Primary | ICD-10-CM

## 2024-09-25 DIAGNOSIS — N92.0 MENORRHAGIA WITH REGULAR CYCLE: ICD-10-CM

## 2024-09-25 DIAGNOSIS — F90.0 ATTENTION DEFICIT HYPERACTIVITY DISORDER (ADHD), PREDOMINANTLY INATTENTIVE TYPE: ICD-10-CM

## 2024-09-25 PROCEDURE — 99214 OFFICE O/P EST MOD 30 MIN: CPT | Performed by: FAMILY MEDICINE

## 2024-09-25 RX ORDER — DEXTROAMPHETAMINE SACCHARATE, AMPHETAMINE ASPARTATE, DEXTROAMPHETAMINE SULFATE AND AMPHETAMINE SULFATE 1.25; 1.25; 1.25; 1.25 MG/1; MG/1; MG/1; MG/1
5 TABLET ORAL 2 TIMES DAILY
Qty: 60 TABLET | Refills: 0 | Status: SHIPPED | OUTPATIENT
Start: 2024-09-25

## 2024-09-25 RX ORDER — SERTRALINE HYDROCHLORIDE 100 MG/1
200 TABLET, FILM COATED ORAL DAILY
Qty: 180 TABLET | Refills: 1 | Status: SHIPPED | OUTPATIENT
Start: 2024-09-25

## 2024-09-25 RX ORDER — TRAZODONE HYDROCHLORIDE 50 MG/1
50 TABLET, FILM COATED ORAL NIGHTLY
Qty: 90 TABLET | Refills: 3 | Status: SHIPPED | OUTPATIENT
Start: 2024-09-25

## 2024-09-26 LAB
BASOPHILS # BLD AUTO: 0.06 10*3/MM3 (ref 0–0.2)
BASOPHILS NFR BLD AUTO: 0.6 % (ref 0–1.5)
EOSINOPHIL # BLD AUTO: 0.33 10*3/MM3 (ref 0–0.4)
EOSINOPHIL NFR BLD AUTO: 3.2 % (ref 0.3–6.2)
ERYTHROCYTE [DISTWIDTH] IN BLOOD BY AUTOMATED COUNT: 14.9 % (ref 12.3–15.4)
HCT VFR BLD AUTO: 39.2 % (ref 34–46.6)
HGB BLD-MCNC: 12.2 G/DL (ref 12–15.9)
IMM GRANULOCYTES # BLD AUTO: 0.06 10*3/MM3 (ref 0–0.05)
IMM GRANULOCYTES NFR BLD AUTO: 0.6 % (ref 0–0.5)
IRON SATN MFR SERPL: 10 % (ref 20–50)
IRON SERPL-MCNC: 45 MCG/DL (ref 37–145)
LYMPHOCYTES # BLD AUTO: 2.63 10*3/MM3 (ref 0.7–3.1)
LYMPHOCYTES NFR BLD AUTO: 25.5 % (ref 19.6–45.3)
MCH RBC QN AUTO: 24.2 PG (ref 26.6–33)
MCHC RBC AUTO-ENTMCNC: 31.1 G/DL (ref 31.5–35.7)
MCV RBC AUTO: 77.8 FL (ref 79–97)
MONOCYTES # BLD AUTO: 0.63 10*3/MM3 (ref 0.1–0.9)
MONOCYTES NFR BLD AUTO: 6.1 % (ref 5–12)
NEUTROPHILS # BLD AUTO: 6.59 10*3/MM3 (ref 1.7–7)
NEUTROPHILS NFR BLD AUTO: 64 % (ref 42.7–76)
PLATELET # BLD AUTO: 250 10*3/MM3 (ref 140–450)
RBC # BLD AUTO: 5.04 10*6/MM3 (ref 3.77–5.28)
TIBC SERPL-MCNC: 468 MCG/DL
TSH SERPL DL<=0.005 MIU/L-ACNC: 1.29 UIU/ML (ref 0.27–4.2)
UIBC SERPL-MCNC: 423 MCG/DL (ref 112–346)
WBC # BLD AUTO: 10.3 10*3/MM3 (ref 3.4–10.8)

## 2024-12-18 ENCOUNTER — TELEMEDICINE (OUTPATIENT)
Dept: FAMILY MEDICINE CLINIC | Facility: CLINIC | Age: 29
End: 2024-12-18
Payer: COMMERCIAL

## 2024-12-18 DIAGNOSIS — F33.42 RECURRENT MAJOR DEPRESSIVE DISORDER, IN FULL REMISSION: ICD-10-CM

## 2024-12-18 DIAGNOSIS — F41.9 ANXIETY: Primary | ICD-10-CM

## 2024-12-18 DIAGNOSIS — E61.1 IRON DEFICIENCY: ICD-10-CM

## 2024-12-18 DIAGNOSIS — F90.0 ATTENTION DEFICIT HYPERACTIVITY DISORDER (ADHD), PREDOMINANTLY INATTENTIVE TYPE: ICD-10-CM

## 2024-12-18 PROCEDURE — 99214 OFFICE O/P EST MOD 30 MIN: CPT | Performed by: FAMILY MEDICINE

## 2024-12-18 RX ORDER — DEXTROAMPHETAMINE SACCHARATE, AMPHETAMINE ASPARTATE MONOHYDRATE, DEXTROAMPHETAMINE SULFATE AND AMPHETAMINE SULFATE 5; 5; 5; 5 MG/1; MG/1; MG/1; MG/1
20 CAPSULE, EXTENDED RELEASE ORAL EVERY MORNING
Qty: 30 CAPSULE | Refills: 0 | Status: SHIPPED | OUTPATIENT
Start: 2024-12-18

## 2024-12-18 NOTE — PROGRESS NOTES
Subjective   Toy NIÑO is a 29 y.o. female.     Chief Complaint   Patient presents with    ADD     Depression and anxiety-Doing better with moods, took herself off wellbutrin as it was causing some sleep issues. Sleeping better on trazodone     ADD-   Her focus and ability to complete tasks is much improved on meds, has been taking 15mg. She is wanting to try to go to XR.     Iron def- advised to take iron and she is here for a recheck.           The following portions of the patient's history were reviewed and updated as appropriate: allergies, current medications, past family history, past medical history, past social history, past surgical history and problem list.    Past Medical History:   Diagnosis Date    ADHD (attention deficit hyperactivity disorder)     Allergic     Allergic rhinitis     Anxiety     Cough     Depression     Elevated BP without diagnosis of hypertension     Headache, tension type, chronic     Low back pain     Migraine     Panic disorder     Right otitis media     Right wrist pain     TMJ arthralgia        Past Surgical History:   Procedure Laterality Date    APPENDECTOMY       SECTION  2022    COLECTOMY PARTIAL / TOTAL      COLON SURGERY      SINUS SURGERY      TONSILLECTOMY         Family History   Problem Relation Age of Onset    Diabetes Mother     Heart disease Mother     Hyperlipidemia Mother     Cancer Mother     Hypertension Mother     Hyperlipidemia Father     Heart disease Father     Depression Father     Hypertension Father     Diabetes Maternal Grandmother     Stroke Maternal Grandmother     Diabetes Maternal Grandfather     Heart disease Paternal Grandmother     Cancer Paternal Grandfather        Social History     Socioeconomic History    Marital status:    Tobacco Use    Smoking status: Never    Smokeless tobacco: Never   Substance and Sexual Activity    Alcohol use: No    Drug use: Never    Sexual activity: Not Currently     Partners: Male     Birth  control/protection: None       Review of Systems   Respiratory:  Negative for shortness of breath.    Cardiovascular:  Negative for palpitations.       Objective   There were no vitals taken for this visit.  There is no height or weight on file to calculate BMI.  Physical Exam  Constitutional:       General: She is not in acute distress.     Appearance: Normal appearance.   Neurological:      General: No focal deficit present.      Mental Status: She is alert and oriented to person, place, and time.   Psychiatric:         Mood and Affect: Mood normal.         Behavior: Behavior normal.           Assessment & Plan   Diagnoses and all orders for this visit:    1. Anxiety (Primary)    2. Recurrent major depressive disorder, in full remission    3. Attention deficit hyperactivity disorder (ADHD), predominantly inattentive type  -     amphetamine-dextroamphetamine XR (Adderall XR) 20 MG 24 hr capsule; Take 1 capsule by mouth Every Morning  Dispense: 30 capsule; Refill: 0    4. Iron deficiency  -     CBC & Differential; Future  -     Iron Profile; Future        Increased adderall, chente, cont meds, f/u in three months and for labs.     Mode of Visit: Video  Location of patient: -HOME-  Location of provider: +[facility]+  You have chosen to receive care through a telehealth visit.  The patient has signed the video visit consent form.  The visit included audio and video interaction. No technical issues occurred during this visit.

## 2025-04-30 ENCOUNTER — TELEPHONE (OUTPATIENT)
Dept: FAMILY MEDICINE CLINIC | Facility: CLINIC | Age: 30
End: 2025-04-30
Payer: COMMERCIAL

## 2025-04-30 NOTE — TELEPHONE ENCOUNTER
OK FOR HUB TO READ & RESCHEDULE WITH ANY PROVIDER      LMTCB- Your appointment on 5/19/25   needs to be rescheduled. Please call our office at [phone] to reschedule. We're sorry for any inconvenience this causes.

## 2025-06-11 ENCOUNTER — OFFICE VISIT (OUTPATIENT)
Dept: FAMILY MEDICINE CLINIC | Facility: CLINIC | Age: 30
End: 2025-06-11
Payer: COMMERCIAL

## 2025-06-11 VITALS
HEIGHT: 69 IN | DIASTOLIC BLOOD PRESSURE: 88 MMHG | BODY MASS INDEX: 43.34 KG/M2 | WEIGHT: 292.6 LBS | HEART RATE: 81 BPM | TEMPERATURE: 98.4 F | SYSTOLIC BLOOD PRESSURE: 122 MMHG | OXYGEN SATURATION: 98 %

## 2025-06-11 DIAGNOSIS — F99 INSOMNIA DUE TO OTHER MENTAL DISORDER: ICD-10-CM

## 2025-06-11 DIAGNOSIS — E61.1 IRON DEFICIENCY: Primary | ICD-10-CM

## 2025-06-11 DIAGNOSIS — F51.05 INSOMNIA DUE TO OTHER MENTAL DISORDER: ICD-10-CM

## 2025-06-11 DIAGNOSIS — F90.0 ATTENTION DEFICIT HYPERACTIVITY DISORDER (ADHD), PREDOMINANTLY INATTENTIVE TYPE: ICD-10-CM

## 2025-06-11 DIAGNOSIS — F41.9 ANXIETY: ICD-10-CM

## 2025-06-11 DIAGNOSIS — Z79.899 LONG TERM USE OF DRUG: ICD-10-CM

## 2025-06-11 PROCEDURE — 99214 OFFICE O/P EST MOD 30 MIN: CPT | Performed by: FAMILY MEDICINE

## 2025-06-11 RX ORDER — DEXTROAMPHETAMINE SACCHARATE, AMPHETAMINE ASPARTATE MONOHYDRATE, DEXTROAMPHETAMINE SULFATE AND AMPHETAMINE SULFATE 5; 5; 5; 5 MG/1; MG/1; MG/1; MG/1
20 CAPSULE, EXTENDED RELEASE ORAL EVERY MORNING
Qty: 90 CAPSULE | Refills: 0 | Status: SHIPPED | OUTPATIENT
Start: 2025-06-11

## 2025-06-11 RX ORDER — TRAZODONE HYDROCHLORIDE 100 MG/1
100 TABLET ORAL NIGHTLY
Qty: 90 TABLET | Refills: 1 | Status: SHIPPED | OUTPATIENT
Start: 2025-06-11

## 2025-06-11 RX ORDER — CLOBETASOL PROPIONATE 0.5 MG/G
CREAM TOPICAL 2 TIMES DAILY PRN
Qty: 60 G | Refills: 1 | Status: SHIPPED | OUTPATIENT
Start: 2025-06-11

## 2025-06-11 RX ORDER — DEXTROAMPHETAMINE SACCHARATE, AMPHETAMINE ASPARTATE, DEXTROAMPHETAMINE SULFATE AND AMPHETAMINE SULFATE 5; 5; 5; 5 MG/1; MG/1; MG/1; MG/1
20 TABLET ORAL DAILY
Qty: 90 TABLET | Refills: 0 | Status: SHIPPED | OUTPATIENT
Start: 2025-06-11

## 2025-06-11 NOTE — PROGRESS NOTES
Subjective   Toy NIÑO is a 29 y.o. female.     Chief Complaint   Patient presents with    office visit       History of Present Illness          Depression and anxiety-Doing better with moods, took herself off wellbutrin as it was causing some sleep issues. Sleeping better on trazodone 100mg.      ADD-   Her focus and ability to complete tasks is much improved on meds but she is having to take it twice a day.      Iron def- advised to take iron and she is here for a recheck. She is not taking iron often.         The following portions of the patient's history were reviewed and updated as appropriate: allergies, current medications, past family history, past medical history, past social history, past surgical history and problem list.    Past Medical History:   Diagnosis Date    ADHD (attention deficit hyperactivity disorder)     Allergic     Allergic rhinitis     Anxiety     Cough     Depression     Elevated BP without diagnosis of hypertension     Headache, tension type, chronic     Low back pain     Migraine     Panic disorder     Right otitis media     Right wrist pain     TMJ arthralgia        Past Surgical History:   Procedure Laterality Date    APPENDECTOMY       SECTION  2022    COLECTOMY PARTIAL / TOTAL      COLON SURGERY      SINUS SURGERY      TONSILLECTOMY         Family History   Problem Relation Age of Onset    Diabetes Mother     Heart disease Mother     Hyperlipidemia Mother     Cancer Mother     Hypertension Mother     Hyperlipidemia Father     Heart disease Father     Depression Father     Hypertension Father     Diabetes Maternal Grandmother     Stroke Maternal Grandmother     Diabetes Maternal Grandfather     Heart disease Paternal Grandmother     Cancer Paternal Grandfather        Social History     Socioeconomic History    Marital status:    Tobacco Use    Smoking status: Never    Smokeless tobacco: Never   Substance and Sexual Activity    Alcohol use: No    Drug use:  "Never    Sexual activity: Not Currently     Partners: Male     Birth control/protection: None       Review of Systems   Respiratory:  Negative for shortness of breath.    Cardiovascular:  Negative for chest pain.   Gastrointestinal:  Negative for nausea.   Neurological:  Negative for dizziness and confusion.       Objective   Visit Vitals  /88 (BP Location: Left arm, Patient Position: Sitting, Cuff Size: Adult)   Pulse 81   Temp 98.4 °F (36.9 °C)   Ht 175.3 cm (69.02\")   Wt 133 kg (292 lb 9.6 oz)   SpO2 98%   BMI 43.19 kg/m²     Body mass index is 43.19 kg/m².  Physical Exam  Constitutional:       Appearance: Normal appearance. She is well-developed.   Cardiovascular:      Rate and Rhythm: Normal rate and regular rhythm.      Heart sounds: Normal heart sounds.   Pulmonary:      Effort: Pulmonary effort is normal.      Breath sounds: Normal breath sounds.   Musculoskeletal:         General: No swelling. Normal range of motion.   Skin:     General: Skin is warm and dry.      Findings: No rash.   Neurological:      General: No focal deficit present.      Mental Status: She is alert and oriented to person, place, and time.   Psychiatric:         Mood and Affect: Mood normal.         Behavior: Behavior normal.           Assessment & Plan   Diagnoses and all orders for this visit:    1. Iron deficiency (Primary)  -     Cancel: CBC & Differential  -     Cancel: Iron Profile w/o Ferritin  -     Iron Profile w/o Ferritin  -     CBC & Differential    2. Anxiety    3. Attention deficit hyperactivity disorder (ADHD), predominantly inattentive type  -     amphetamine-dextroamphetamine XR (Adderall XR) 20 MG 24 hr capsule; Take 1 capsule by mouth Every Morning  Dispense: 90 capsule; Refill: 0  -     amphetamine-dextroamphetamine (Adderall) 20 MG tablet; Take 1 tablet by mouth Daily. At lunch  Dispense: 90 tablet; Refill: 0    4. Insomnia due to other mental disorder  -     traZODone (DESYREL) 100 MG tablet; Take 1 tablet by " mouth Every Night.  Dispense: 90 tablet; Refill: 1    5. Long term use of drug  -     ToxAssure Flex 13, Urine -    Other orders  -     clobetasol propionate (TEMOVATE) 0.05 % cream; Apply  topically to the appropriate area as directed 2 (Two) Times a Day As Needed (rash).  Dispense: 60 g; Refill: 1        Will let her know if she needs to go back on iron. Sincere, franchesca meds, f/u in 3 months.

## 2025-06-14 DIAGNOSIS — F41.9 ANXIETY: ICD-10-CM

## 2025-06-14 DIAGNOSIS — F33.42 RECURRENT MAJOR DEPRESSIVE DISORDER, IN FULL REMISSION: ICD-10-CM

## 2025-06-16 LAB
1OH-MIDAZOLAM UR QL SCN: NOT DETECTED NG/MG CREAT
6MAM UR QL SCN: NEGATIVE NG/ML
7AMINOCLONAZEPAM/CREAT UR: NOT DETECTED NG/MG CREAT
A-OH ALPRAZ/CREAT UR: NOT DETECTED NG/MG CREAT
A-OH-TRIAZOLAM/CREAT UR CFM: NOT DETECTED NG/MG CREAT
ALPRAZ/CREAT UR CFM: NOT DETECTED NG/MG CREAT
AMPHET UR CFM-MCNC: 811 NG/MG CREAT
AMPHETAMINES UR QL SCN: NORMAL NG/ML
AMPHETAMINES UR QL: NORMAL
BARBITURATES UR QL SCN: NEGATIVE NG/ML
BASOPHILS # BLD AUTO: 0 X10E3/UL (ref 0–0.2)
BASOPHILS NFR BLD AUTO: 0 %
BENZODIAZ SCN METH UR: NEGATIVE
BUPRENORPHINE UR QL SCN: NEGATIVE
BUPRENORPHINE/CREAT UR: NOT DETECTED NG/MG CREAT
CLONAZEPAM/CREAT UR CFM: NOT DETECTED NG/MG CREAT
COCAINE+BZE UR QL SCN: NEGATIVE NG/ML
CREAT UR-MCNC: 114 MG/DL
DESALKYLFLURAZ/CREAT UR: NOT DETECTED NG/MG CREAT
DIAZEPAM/CREAT UR: NOT DETECTED NG/MG CREAT
EOSINOPHIL # BLD AUTO: 0.2 X10E3/UL (ref 0–0.4)
EOSINOPHIL NFR BLD AUTO: 2 %
ERYTHROCYTE [DISTWIDTH] IN BLOOD BY AUTOMATED COUNT: 16.2 % (ref 11.7–15.4)
FENTANYL CTO UR SCN-MCNC: NEGATIVE NG/ML
FENTANYL/CREAT UR: NOT DETECTED NG/MG CREAT
FLUNITRAZEPAM UR QL SCN: NOT DETECTED NG/MG CREAT
HCT VFR BLD AUTO: 38.1 % (ref 34–46.6)
HGB BLD-MCNC: 11.4 G/DL (ref 11.1–15.9)
IMM GRANULOCYTES # BLD AUTO: 0.1 X10E3/UL (ref 0–0.1)
IMM GRANULOCYTES NFR BLD AUTO: 1 %
IRON SATN MFR SERPL: 7 % (ref 15–55)
IRON SERPL-MCNC: 28 UG/DL (ref 27–159)
LORAZEPAM/CREAT UR: NOT DETECTED NG/MG CREAT
LYMPHOCYTES # BLD AUTO: 2.4 X10E3/UL (ref 0.7–3.1)
LYMPHOCYTES NFR BLD AUTO: 22 %
MCH RBC QN AUTO: 23.9 PG (ref 26.6–33)
MCHC RBC AUTO-ENTMCNC: 29.9 G/DL (ref 31.5–35.7)
MCV RBC AUTO: 80 FL (ref 79–97)
MDA UR CFM-MCNC: NOT DETECTED NG/MG CREAT
MDMA UR CFM-MCNC: NOT DETECTED NG/MG CREAT
METHADONE UR QL SCN: NEGATIVE NG/ML
METHADONE+METAB UR QL SCN: NEGATIVE NG/ML
METHAMPHET UR CFM-MCNC: NOT DETECTED NG/MG CREAT
MIDAZOLAM/CREAT UR CFM: NOT DETECTED NG/MG CREAT
MONOCYTES # BLD AUTO: 0.6 X10E3/UL (ref 0.1–0.9)
MONOCYTES NFR BLD AUTO: 6 %
NEUTROPHILS # BLD AUTO: 7.5 X10E3/UL (ref 1.4–7)
NEUTROPHILS NFR BLD AUTO: 69 %
NORBUPRENORPHINE/CREAT UR: NOT DETECTED NG/MG CREAT
NORDIAZEPAM/CREAT UR: NOT DETECTED NG/MG CREAT
NORFENTANYL/CREAT UR: NOT DETECTED NG/MG CREAT
NORFLUNITRAZEPAM UR-MCNC: NOT DETECTED NG/MG CREAT
OPIATES UR SCN-MCNC: NEGATIVE NG/ML
OXAZEPAM/CREAT UR: NOT DETECTED NG/MG CREAT
OXYCODONE CTO UR SCN-MCNC: NEGATIVE NG/ML
PCP UR QL SCN: NEGATIVE NG/ML
PLATELET # BLD AUTO: 276 X10E3/UL (ref 150–450)
PRESCRIBED MEDICATIONS: NORMAL
RBC # BLD AUTO: 4.77 X10E6/UL (ref 3.77–5.28)
TAPENTADOL CTO UR SCN-MCNC: NEGATIVE NG/ML
TEMAZEPAM/CREAT UR: NOT DETECTED NG/MG CREAT
TIBC SERPL-MCNC: 379 UG/DL (ref 250–450)
TRAMADOL UR QL SCN: NEGATIVE NG/ML
UIBC SERPL-MCNC: 351 UG/DL (ref 131–425)
WBC # BLD AUTO: 10.8 X10E3/UL (ref 3.4–10.8)

## 2025-06-16 RX ORDER — SERTRALINE HYDROCHLORIDE 100 MG/1
200 TABLET, FILM COATED ORAL DAILY
Qty: 180 TABLET | Refills: 1 | Status: SHIPPED | OUTPATIENT
Start: 2025-06-16